# Patient Record
Sex: MALE | Employment: FULL TIME | URBAN - METROPOLITAN AREA
[De-identification: names, ages, dates, MRNs, and addresses within clinical notes are randomized per-mention and may not be internally consistent; named-entity substitution may affect disease eponyms.]

---

## 2017-01-01 ENCOUNTER — APPOINTMENT (OUTPATIENT)
Dept: CT IMAGING | Age: 47
DRG: 246 | End: 2017-01-01
Attending: INTERNAL MEDICINE
Payer: COMMERCIAL

## 2017-01-01 ENCOUNTER — APPOINTMENT (OUTPATIENT)
Dept: GENERAL RADIOLOGY | Age: 47
DRG: 246 | End: 2017-01-01
Attending: INTERNAL MEDICINE
Payer: COMMERCIAL

## 2017-01-01 ENCOUNTER — APPOINTMENT (OUTPATIENT)
Dept: CT IMAGING | Age: 47
DRG: 246 | End: 2017-01-01
Attending: PSYCHIATRY & NEUROLOGY
Payer: COMMERCIAL

## 2017-01-01 ENCOUNTER — HOSPITAL ENCOUNTER (INPATIENT)
Age: 47
LOS: 6 days | DRG: 246 | End: 2017-05-15
Attending: EMERGENCY MEDICINE | Admitting: INTERNAL MEDICINE
Payer: COMMERCIAL

## 2017-01-01 VITALS
BODY MASS INDEX: 39.42 KG/M2 | RESPIRATION RATE: 25 BRPM | HEIGHT: 70 IN | SYSTOLIC BLOOD PRESSURE: 124 MMHG | OXYGEN SATURATION: 14 % | DIASTOLIC BLOOD PRESSURE: 68 MMHG | HEART RATE: 45 BPM | WEIGHT: 275.35 LBS | TEMPERATURE: 100.3 F

## 2017-01-01 DIAGNOSIS — I46.9 CARDIAC ARREST (HCC): ICD-10-CM

## 2017-01-01 DIAGNOSIS — I21.02 ST ELEVATION MYOCARDIAL INFARCTION INVOLVING LEFT ANTERIOR DESCENDING (LAD) CORONARY ARTERY (HCC): ICD-10-CM

## 2017-01-01 DIAGNOSIS — E78.01 FAMILIAL HYPERCHOLESTEROLEMIA: ICD-10-CM

## 2017-01-01 DIAGNOSIS — R40.2431 GLASGOW COMA SCALE TOTAL SCORE 3-8, IN THE FIELD (EMT OR AMBULANCE) (HCC): ICD-10-CM

## 2017-01-01 DIAGNOSIS — M47.812 SPONDYLOSIS OF CERVICAL REGION WITHOUT MYELOPATHY OR RADICULOPATHY: ICD-10-CM

## 2017-01-01 DIAGNOSIS — G93.1 ANOXIC BRAIN INJURY (HCC): ICD-10-CM

## 2017-01-01 DIAGNOSIS — I10 ESSENTIAL HYPERTENSION: ICD-10-CM

## 2017-01-01 DIAGNOSIS — I21.3 ST ELEVATION MYOCARDIAL INFARCTION (STEMI), UNSPECIFIED ARTERY (HCC): Primary | ICD-10-CM

## 2017-01-01 DIAGNOSIS — R56.9 SEIZURE (HCC): ICD-10-CM

## 2017-01-01 LAB
ABO + RH BLD: NORMAL
ACT BLD: 389 SECS (ref 79–138)
ALBUMIN SERPL BCP-MCNC: 2.4 G/DL (ref 3.5–5)
ALBUMIN SERPL BCP-MCNC: 2.7 G/DL (ref 3.5–5)
ALBUMIN SERPL BCP-MCNC: 3.1 G/DL (ref 3.5–5)
ALBUMIN SERPL BCP-MCNC: 3.4 G/DL (ref 3.5–5)
ALBUMIN SERPL BCP-MCNC: 3.4 G/DL (ref 3.5–5)
ALBUMIN/GLOB SERPL: 0.6 {RATIO} (ref 1.1–2.2)
ALBUMIN/GLOB SERPL: 0.7 {RATIO} (ref 1.1–2.2)
ALBUMIN/GLOB SERPL: 0.9 {RATIO} (ref 1.1–2.2)
ALP SERPL-CCNC: 105 U/L (ref 45–117)
ALP SERPL-CCNC: 70 U/L (ref 45–117)
ALP SERPL-CCNC: 73 U/L (ref 45–117)
ALP SERPL-CCNC: 78 U/L (ref 45–117)
ALP SERPL-CCNC: 87 U/L (ref 45–117)
ALT SERPL-CCNC: 120 U/L (ref 12–78)
ALT SERPL-CCNC: 123 U/L (ref 12–78)
ALT SERPL-CCNC: 54 U/L (ref 12–78)
ALT SERPL-CCNC: 59 U/L (ref 12–78)
ALT SERPL-CCNC: 77 U/L (ref 12–78)
ANION GAP BLD CALC-SCNC: 10 MMOL/L (ref 5–15)
ANION GAP BLD CALC-SCNC: 11 MMOL/L (ref 5–15)
ANION GAP BLD CALC-SCNC: 11 MMOL/L (ref 5–15)
ANION GAP BLD CALC-SCNC: 13 MMOL/L (ref 5–15)
ANION GAP BLD CALC-SCNC: 7 MMOL/L (ref 5–15)
ANION GAP BLD CALC-SCNC: 7 MMOL/L (ref 5–15)
ANION GAP BLD CALC-SCNC: 9 MMOL/L (ref 5–15)
ARTERIAL PATENCY WRIST A: ABNORMAL
ARTERIAL PATENCY WRIST A: ABNORMAL
ARTERIAL PATENCY WRIST A: YES
AST SERPL W P-5'-P-CCNC: 138 U/L (ref 15–37)
AST SERPL W P-5'-P-CCNC: 193 U/L (ref 15–37)
AST SERPL W P-5'-P-CCNC: 72 U/L (ref 15–37)
AST SERPL W P-5'-P-CCNC: 77 U/L (ref 15–37)
AST SERPL W P-5'-P-CCNC: 97 U/L (ref 15–37)
ATRIAL RATE: 75 BPM
BASE DEFICIT BLDA-SCNC: 1 MMOL/L
BASE DEFICIT BLDA-SCNC: 1.2 MMOL/L
BASE DEFICIT BLDA-SCNC: 1.4 MMOL/L
BASE DEFICIT BLDA-SCNC: 1.7 MMOL/L
BASE DEFICIT BLDA-SCNC: 3.3 MMOL/L
BASE EXCESS BLDA CALC-SCNC: 0 MMOL/L
BASE EXCESS BLDA CALC-SCNC: 1.8 MMOL/L
BASE EXCESS BLDA CALC-SCNC: 2.5 MMOL/L
BASOPHILS # BLD AUTO: 0 K/UL
BASOPHILS # BLD: 0 %
BDY SITE: ABNORMAL
BILIRUB SERPL-MCNC: 0.4 MG/DL (ref 0.2–1)
BILIRUB SERPL-MCNC: 0.5 MG/DL (ref 0.2–1)
BILIRUB SERPL-MCNC: 0.6 MG/DL (ref 0.2–1)
BLOOD GROUP ANTIBODIES SERPL: NORMAL
BREATHS.SPONTANEOUS ON VENT: 20
BREATHS.SPONTANEOUS ON VENT: 20
BUN SERPL-MCNC: 14 MG/DL (ref 6–20)
BUN SERPL-MCNC: 15 MG/DL (ref 6–20)
BUN SERPL-MCNC: 17 MG/DL (ref 6–20)
BUN SERPL-MCNC: 17 MG/DL (ref 6–20)
BUN SERPL-MCNC: 18 MG/DL (ref 6–20)
BUN SERPL-MCNC: 19 MG/DL (ref 6–20)
BUN SERPL-MCNC: 21 MG/DL (ref 6–20)
BUN/CREAT SERPL: 12 (ref 12–20)
BUN/CREAT SERPL: 14 (ref 12–20)
BUN/CREAT SERPL: 15 (ref 12–20)
BUN/CREAT SERPL: 15 (ref 12–20)
BUN/CREAT SERPL: 16 (ref 12–20)
BUN/CREAT SERPL: 20 (ref 12–20)
BUN/CREAT SERPL: 24 (ref 12–20)
CALCIUM SERPL-MCNC: 7.9 MG/DL (ref 8.5–10.1)
CALCIUM SERPL-MCNC: 8.1 MG/DL (ref 8.5–10.1)
CALCIUM SERPL-MCNC: 8.1 MG/DL (ref 8.5–10.1)
CALCIUM SERPL-MCNC: 8.2 MG/DL (ref 8.5–10.1)
CALCIUM SERPL-MCNC: 8.4 MG/DL (ref 8.5–10.1)
CALCIUM SERPL-MCNC: 8.4 MG/DL (ref 8.5–10.1)
CALCIUM SERPL-MCNC: 8.5 MG/DL (ref 8.5–10.1)
CALCULATED P AXIS, ECG09: 33 DEGREES
CALCULATED R AXIS, ECG10: -4 DEGREES
CALCULATED T AXIS, ECG11: 84 DEGREES
CHLORIDE SERPL-SCNC: 100 MMOL/L (ref 97–108)
CHLORIDE SERPL-SCNC: 102 MMOL/L (ref 97–108)
CHLORIDE SERPL-SCNC: 102 MMOL/L (ref 97–108)
CHLORIDE SERPL-SCNC: 104 MMOL/L (ref 97–108)
CHLORIDE SERPL-SCNC: 105 MMOL/L (ref 97–108)
CHOLEST SERPL-MCNC: 168 MG/DL
CK MB CFR SERPL CALC: 3.1 % (ref 0–2.5)
CK MB CFR SERPL CALC: 4.9 % (ref 0–2.5)
CK MB SERPL-MCNC: 46.4 NG/ML (ref 5–25)
CK MB SERPL-MCNC: 90.2 NG/ML (ref 5–25)
CK SERPL-CCNC: 2872 U/L (ref 39–308)
CK SERPL-CCNC: 951 U/L (ref 39–308)
CO2 SERPL-SCNC: 19 MMOL/L (ref 21–32)
CO2 SERPL-SCNC: 23 MMOL/L (ref 21–32)
CO2 SERPL-SCNC: 24 MMOL/L (ref 21–32)
CO2 SERPL-SCNC: 24 MMOL/L (ref 21–32)
CO2 SERPL-SCNC: 25 MMOL/L (ref 21–32)
CO2 SERPL-SCNC: 25 MMOL/L (ref 21–32)
CO2 SERPL-SCNC: 27 MMOL/L (ref 21–32)
CREAT SERPL-MCNC: 0.74 MG/DL (ref 0.7–1.3)
CREAT SERPL-MCNC: 0.93 MG/DL (ref 0.7–1.3)
CREAT SERPL-MCNC: 0.97 MG/DL (ref 0.7–1.3)
CREAT SERPL-MCNC: 1.07 MG/DL (ref 0.7–1.3)
CREAT SERPL-MCNC: 1.15 MG/DL (ref 0.7–1.3)
CREAT SERPL-MCNC: 1.32 MG/DL (ref 0.7–1.3)
CREAT SERPL-MCNC: 1.47 MG/DL (ref 0.7–1.3)
DIAGNOSIS, 93000: NORMAL
DIFFERENTIAL METHOD BLD: ABNORMAL
EOSINOPHIL # BLD: 0 K/UL
EOSINOPHIL # BLD: 0.2 K/UL
EOSINOPHIL NFR BLD: 0 %
EOSINOPHIL NFR BLD: 1 %
EPAP/CPAP/PEEP, PAPEEP: 10
EPAP/CPAP/PEEP, PAPEEP: 13
EPAP/CPAP/PEEP, PAPEEP: 5
ERYTHROCYTE [DISTWIDTH] IN BLOOD BY AUTOMATED COUNT: 12.9 % (ref 11.5–14.5)
ERYTHROCYTE [DISTWIDTH] IN BLOOD BY AUTOMATED COUNT: 13.3 % (ref 11.5–14.5)
ERYTHROCYTE [DISTWIDTH] IN BLOOD BY AUTOMATED COUNT: 13.5 % (ref 11.5–14.5)
EST. AVERAGE GLUCOSE BLD GHB EST-MCNC: 111 MG/DL
FIO2 ON VENT: 100 %
FIO2 ON VENT: 90 %
GAS FLOW.O2 SETTING OXYMISER: 12 L/MIN
GAS FLOW.O2 SETTING OXYMISER: 20 L/MIN
GLOBULIN SER CALC-MCNC: 3.5 G/DL (ref 2–4)
GLOBULIN SER CALC-MCNC: 3.7 G/DL (ref 2–4)
GLOBULIN SER CALC-MCNC: 3.7 G/DL (ref 2–4)
GLOBULIN SER CALC-MCNC: 4 G/DL (ref 2–4)
GLOBULIN SER CALC-MCNC: 4 G/DL (ref 2–4)
GLUCOSE SERPL-MCNC: 102 MG/DL (ref 65–100)
GLUCOSE SERPL-MCNC: 106 MG/DL (ref 65–100)
GLUCOSE SERPL-MCNC: 109 MG/DL (ref 65–100)
GLUCOSE SERPL-MCNC: 127 MG/DL (ref 65–100)
GLUCOSE SERPL-MCNC: 129 MG/DL (ref 65–100)
GLUCOSE SERPL-MCNC: 131 MG/DL (ref 65–100)
GLUCOSE SERPL-MCNC: 98 MG/DL (ref 65–100)
HBA1C MFR BLD: 5.5 % (ref 4.2–6.3)
HCO3 BLDA-SCNC: 20 MMOL/L (ref 22–26)
HCO3 BLDA-SCNC: 21 MMOL/L (ref 22–26)
HCO3 BLDA-SCNC: 22 MMOL/L (ref 22–26)
HCO3 BLDA-SCNC: 24 MMOL/L (ref 22–26)
HCO3 BLDA-SCNC: 25 MMOL/L (ref 22–26)
HCO3 BLDA-SCNC: 27 MMOL/L (ref 22–26)
HCT VFR BLD AUTO: 32.5 % (ref 36.6–50.3)
HCT VFR BLD AUTO: 35.4 % (ref 36.6–50.3)
HCT VFR BLD AUTO: 37.1 % (ref 36.6–50.3)
HCT VFR BLD AUTO: 39.5 % (ref 36.6–50.3)
HCT VFR BLD AUTO: 44.9 % (ref 36.6–50.3)
HCT VFR BLD AUTO: 45 % (ref 36.6–50.3)
HDLC SERPL-MCNC: 39 MG/DL
HDLC SERPL: 4.3 {RATIO} (ref 0–5)
HGB BLD-MCNC: 10.7 G/DL (ref 12.1–17)
HGB BLD-MCNC: 12 G/DL (ref 12.1–17)
HGB BLD-MCNC: 12.4 G/DL (ref 12.1–17)
HGB BLD-MCNC: 13.5 G/DL (ref 12.1–17)
HGB BLD-MCNC: 15.3 G/DL (ref 12.1–17)
HGB BLD-MCNC: 15.7 G/DL (ref 12.1–17)
INR PPP: 1 (ref 0.9–1.1)
INR PPP: 2.5 (ref 0.9–1.1)
IPAP/PIP, IPAPIP: 25
LDLC SERPL CALC-MCNC: 101 MG/DL (ref 0–100)
LIPID PROFILE,FLP: ABNORMAL
LYMPHOCYTES # BLD AUTO: 10 %
LYMPHOCYTES # BLD AUTO: 12 %
LYMPHOCYTES # BLD AUTO: 4 %
LYMPHOCYTES # BLD AUTO: 6 %
LYMPHOCYTES # BLD AUTO: 6 %
LYMPHOCYTES # BLD: 1.2 K/UL
LYMPHOCYTES # BLD: 1.3 K/UL
LYMPHOCYTES # BLD: 1.8 K/UL
LYMPHOCYTES # BLD: 2.5 K/UL
LYMPHOCYTES # BLD: 2.7 K/UL
MAGNESIUM SERPL-MCNC: 2.1 MG/DL (ref 1.6–2.4)
MAGNESIUM SERPL-MCNC: 2.2 MG/DL (ref 1.6–2.4)
MAGNESIUM SERPL-MCNC: 2.4 MG/DL (ref 1.6–2.4)
MCH RBC QN AUTO: 29.9 PG (ref 26–34)
MCH RBC QN AUTO: 30.2 PG (ref 26–34)
MCH RBC QN AUTO: 30.5 PG (ref 26–34)
MCH RBC QN AUTO: 30.8 PG (ref 26–34)
MCH RBC QN AUTO: 31.1 PG (ref 26–34)
MCH RBC QN AUTO: 31.4 PG (ref 26–34)
MCHC RBC AUTO-ENTMCNC: 32.9 G/DL (ref 30–36.5)
MCHC RBC AUTO-ENTMCNC: 33.4 G/DL (ref 30–36.5)
MCHC RBC AUTO-ENTMCNC: 33.9 G/DL (ref 30–36.5)
MCHC RBC AUTO-ENTMCNC: 34 G/DL (ref 30–36.5)
MCHC RBC AUTO-ENTMCNC: 34.2 G/DL (ref 30–36.5)
MCHC RBC AUTO-ENTMCNC: 35 G/DL (ref 30–36.5)
MCV RBC AUTO: 89.8 FL (ref 80–99)
MCV RBC AUTO: 90 FL (ref 80–99)
MCV RBC AUTO: 90.1 FL (ref 80–99)
MCV RBC AUTO: 90.5 FL (ref 80–99)
MCV RBC AUTO: 90.8 FL (ref 80–99)
MCV RBC AUTO: 91.5 FL (ref 80–99)
METAMYELOCYTES NFR BLD MANUAL: 2 %
MONOCYTES # BLD: 1.2 K/UL
MONOCYTES # BLD: 1.2 K/UL
MONOCYTES # BLD: 1.5 K/UL
MONOCYTES # BLD: 1.6 K/UL
MONOCYTES # BLD: 1.7 K/UL
MONOCYTES NFR BLD AUTO: 4 %
MONOCYTES NFR BLD AUTO: 4 %
MONOCYTES NFR BLD AUTO: 6 %
MONOCYTES NFR BLD AUTO: 7 %
MONOCYTES NFR BLD AUTO: 8 %
NEUTS BAND NFR BLD MANUAL: 1 %
NEUTS BAND NFR BLD MANUAL: 5 %
NEUTS BAND NFR BLD MANUAL: 5 %
NEUTS SEG # BLD: 16.4 K/UL
NEUTS SEG # BLD: 18.5 K/UL
NEUTS SEG # BLD: 22.3 K/UL
NEUTS SEG # BLD: 26.8 K/UL
NEUTS SEG # BLD: 27.1 K/UL
NEUTS SEG NFR BLD AUTO: 79 %
NEUTS SEG NFR BLD AUTO: 83 %
NEUTS SEG NFR BLD AUTO: 84 %
NEUTS SEG NFR BLD AUTO: 86 %
NEUTS SEG NFR BLD AUTO: 87 %
NRBC # BLD: 0 K/UL (ref 0–0.01)
NRBC # BLD: 0.22 K/UL (ref 0–0.01)
NRBC BLD-RTO: 0 PER 100 WBC
NRBC BLD-RTO: 0.7 PER 100 WBC
P-R INTERVAL, ECG05: 170 MS
PCO2 BLDA: 26 MMHG (ref 35–45)
PCO2 BLDA: 31 MMHG (ref 35–45)
PCO2 BLDA: 33 MMHG (ref 35–45)
PCO2 BLDA: 33 MMHG (ref 35–45)
PCO2 BLDA: 35 MMHG (ref 35–45)
PCO2 BLDA: 40 MMHG (ref 35–45)
PCO2 BLDA: 43 MMHG (ref 35–45)
PCO2 BLDA: 49 MMHG (ref 35–45)
PH BLDA: 7.32 [PH] (ref 7.35–7.45)
PH BLDA: 7.36 [PH] (ref 7.35–7.45)
PH BLDA: 7.41 [PH] (ref 7.35–7.45)
PH BLDA: 7.44 [PH] (ref 7.35–7.45)
PH BLDA: 7.44 [PH] (ref 7.35–7.45)
PH BLDA: 7.45 [PH] (ref 7.35–7.45)
PH BLDA: 7.51 [PH] (ref 7.35–7.45)
PH BLDA: 7.51 [PH] (ref 7.35–7.45)
PLATELET # BLD AUTO: 193 K/UL (ref 150–400)
PLATELET # BLD AUTO: 213 K/UL (ref 150–400)
PLATELET # BLD AUTO: 225 K/UL (ref 150–400)
PLATELET # BLD AUTO: 239 K/UL (ref 150–400)
PLATELET # BLD AUTO: 284 K/UL (ref 150–400)
PLATELET # BLD AUTO: 286 K/UL (ref 150–400)
PLATELET COMMENTS,PCOM: ABNORMAL
PO2 BLDA: 113 MMHG (ref 80–100)
PO2 BLDA: 199 MMHG (ref 80–100)
PO2 BLDA: 52 MMHG (ref 80–100)
PO2 BLDA: 61 MMHG (ref 80–100)
PO2 BLDA: 62 MMHG (ref 80–100)
PO2 BLDA: 87 MMHG (ref 80–100)
PO2 BLDA: 89 MMHG (ref 80–100)
PO2 BLDA: 99 MMHG (ref 80–100)
POTASSIUM SERPL-SCNC: 3.1 MMOL/L (ref 3.5–5.1)
POTASSIUM SERPL-SCNC: 3.2 MMOL/L (ref 3.5–5.1)
POTASSIUM SERPL-SCNC: 3.3 MMOL/L (ref 3.5–5.1)
POTASSIUM SERPL-SCNC: 3.3 MMOL/L (ref 3.5–5.1)
POTASSIUM SERPL-SCNC: 3.4 MMOL/L (ref 3.5–5.1)
POTASSIUM SERPL-SCNC: 3.6 MMOL/L (ref 3.5–5.1)
POTASSIUM SERPL-SCNC: 3.7 MMOL/L (ref 3.5–5.1)
PROT SERPL-MCNC: 6.4 G/DL (ref 6.4–8.2)
PROT SERPL-MCNC: 6.6 G/DL (ref 6.4–8.2)
PROT SERPL-MCNC: 6.7 G/DL (ref 6.4–8.2)
PROT SERPL-MCNC: 7.1 G/DL (ref 6.4–8.2)
PROT SERPL-MCNC: 7.1 G/DL (ref 6.4–8.2)
PROTHROMBIN TIME: 10.1 SEC (ref 9–11.1)
PROTHROMBIN TIME: 26.2 SEC (ref 9–11.1)
Q-T INTERVAL, ECG07: 378 MS
QRS DURATION, ECG06: 104 MS
QTC CALCULATION (BEZET), ECG08: 422 MS
RBC # BLD AUTO: 3.58 M/UL (ref 4.1–5.7)
RBC # BLD AUTO: 3.93 M/UL (ref 4.1–5.7)
RBC # BLD AUTO: 4.1 M/UL (ref 4.1–5.7)
RBC # BLD AUTO: 4.39 M/UL (ref 4.1–5.7)
RBC # BLD AUTO: 4.92 M/UL (ref 4.1–5.7)
RBC # BLD AUTO: 5 M/UL (ref 4.1–5.7)
RBC MORPH BLD: ABNORMAL
SAO2 % BLD: 89 % (ref 92–97)
SAO2 % BLD: 91 % (ref 92–97)
SAO2 % BLD: 94 % (ref 92–97)
SAO2 % BLD: 97 % (ref 92–97)
SAO2 % BLD: 97 % (ref 92–97)
SAO2 % BLD: 98 % (ref 92–97)
SAO2 % BLD: 98 % (ref 92–97)
SAO2 % BLD: 99 % (ref 92–97)
SAO2% DEVICE SAO2% SENSOR NAME: ABNORMAL
SODIUM SERPL-SCNC: 136 MMOL/L (ref 136–145)
SODIUM SERPL-SCNC: 137 MMOL/L (ref 136–145)
SODIUM SERPL-SCNC: 137 MMOL/L (ref 136–145)
SODIUM SERPL-SCNC: 140 MMOL/L (ref 136–145)
SPECIMEN EXP DATE BLD: NORMAL
SPECIMEN SITE: ABNORMAL
TRIGL SERPL-MCNC: 140 MG/DL (ref ?–150)
TROPONIN I SERPL-MCNC: 11.5 NG/ML
TROPONIN I SERPL-MCNC: 41.8 NG/ML
VENTILATION MODE VENT: ABNORMAL
VENTRICULAR RATE, ECG03: 75 BPM
VLDLC SERPL CALC-MCNC: 28 MG/DL
VT SETTING VENT: 550 ML
VT SETTING VENT: 600 ML
WBC # BLD AUTO: 20.8 K/UL (ref 4.1–11.1)
WBC # BLD AUTO: 21.3 K/UL (ref 4.1–11.1)
WBC # BLD AUTO: 23.3 K/UL (ref 4.1–11.1)
WBC # BLD AUTO: 26.6 K/UL (ref 4.1–11.1)
WBC # BLD AUTO: 29.5 K/UL (ref 4.1–11.1)
WBC # BLD AUTO: 30.4 K/UL (ref 4.1–11.1)
WBC NRBC COR # BLD: ABNORMAL 10*3/UL

## 2017-01-01 PROCEDURE — 36415 COLL VENOUS BLD VENIPUNCTURE: CPT | Performed by: INTERNAL MEDICINE

## 2017-01-01 PROCEDURE — 85025 COMPLETE CBC W/AUTO DIFF WBC: CPT | Performed by: INTERNAL MEDICINE

## 2017-01-01 PROCEDURE — 74011000250 HC RX REV CODE- 250: Performed by: INTERNAL MEDICINE

## 2017-01-01 PROCEDURE — 94640 AIRWAY INHALATION TREATMENT: CPT

## 2017-01-01 PROCEDURE — 74011250636 HC RX REV CODE- 250/636: Performed by: SPECIALIST

## 2017-01-01 PROCEDURE — 85610 PROTHROMBIN TIME: CPT | Performed by: INTERNAL MEDICINE

## 2017-01-01 PROCEDURE — 74011250637 HC RX REV CODE- 250/637: Performed by: INTERNAL MEDICINE

## 2017-01-01 PROCEDURE — 83735 ASSAY OF MAGNESIUM: CPT | Performed by: INTERNAL MEDICINE

## 2017-01-01 PROCEDURE — 74011250636 HC RX REV CODE- 250/636: Performed by: INTERNAL MEDICINE

## 2017-01-01 PROCEDURE — B2111ZZ FLUOROSCOPY OF MULTIPLE CORONARY ARTERIES USING LOW OSMOLAR CONTRAST: ICD-10-PCS | Performed by: INTERNAL MEDICINE

## 2017-01-01 PROCEDURE — 80061 LIPID PANEL: CPT | Performed by: INTERNAL MEDICINE

## 2017-01-01 PROCEDURE — 4A023N7 MEASUREMENT OF CARDIAC SAMPLING AND PRESSURE, LEFT HEART, PERCUTANEOUS APPROACH: ICD-10-PCS | Performed by: INTERNAL MEDICINE

## 2017-01-01 PROCEDURE — 80048 BASIC METABOLIC PNL TOTAL CA: CPT | Performed by: INTERNAL MEDICINE

## 2017-01-01 PROCEDURE — 74011000250 HC RX REV CODE- 250: Performed by: SPECIALIST

## 2017-01-01 PROCEDURE — 65620000000 HC RM CCU GENERAL

## 2017-01-01 PROCEDURE — 82803 BLOOD GASES ANY COMBINATION: CPT | Performed by: INTERNAL MEDICINE

## 2017-01-01 PROCEDURE — C1894 INTRO/SHEATH, NON-LASER: HCPCS

## 2017-01-01 PROCEDURE — 74011000258 HC RX REV CODE- 258: Performed by: INTERNAL MEDICINE

## 2017-01-01 PROCEDURE — 5A1955Z RESPIRATORY VENTILATION, GREATER THAN 96 CONSECUTIVE HOURS: ICD-10-PCS | Performed by: INTERNAL MEDICINE

## 2017-01-01 PROCEDURE — 77030010221 HC SPLNT WR POS TELE -B

## 2017-01-01 PROCEDURE — 80053 COMPREHEN METABOLIC PANEL: CPT | Performed by: INTERNAL MEDICINE

## 2017-01-01 PROCEDURE — 36600 WITHDRAWAL OF ARTERIAL BLOOD: CPT | Performed by: INTERNAL MEDICINE

## 2017-01-01 PROCEDURE — 77030018846 HC SOL IRR STRL H20 ICUM -A

## 2017-01-01 PROCEDURE — C9113 INJ PANTOPRAZOLE SODIUM, VIA: HCPCS | Performed by: SPECIALIST

## 2017-01-01 PROCEDURE — 71010 XR CHEST PORT: CPT

## 2017-01-01 PROCEDURE — 74000 XR ABD (KUB): CPT

## 2017-01-01 PROCEDURE — 0DH67UZ INSERTION OF FEEDING DEVICE INTO STOMACH, VIA NATURAL OR ARTIFICIAL OPENING: ICD-10-PCS | Performed by: INTERNAL MEDICINE

## 2017-01-01 PROCEDURE — 77030004543 HC CATH ANGI DX MRTM -A

## 2017-01-01 PROCEDURE — 77030034848

## 2017-01-01 PROCEDURE — 82550 ASSAY OF CK (CPK): CPT | Performed by: INTERNAL MEDICINE

## 2017-01-01 PROCEDURE — C1887 CATHETER, GUIDING: HCPCS

## 2017-01-01 PROCEDURE — 77030008543 HC TBNG MON PRSS MRTM -A

## 2017-01-01 PROCEDURE — 94003 VENT MGMT INPAT SUBQ DAY: CPT

## 2017-01-01 PROCEDURE — 70450 CT HEAD/BRAIN W/O DYE: CPT

## 2017-01-01 PROCEDURE — 51798 US URINE CAPACITY MEASURE: CPT

## 2017-01-01 PROCEDURE — 93458 L HRT ARTERY/VENTRICLE ANGIO: CPT

## 2017-01-01 PROCEDURE — 84484 ASSAY OF TROPONIN QUANT: CPT | Performed by: INTERNAL MEDICINE

## 2017-01-01 PROCEDURE — 74011250636 HC RX REV CODE- 250/636: Performed by: PSYCHIATRY & NEUROLOGY

## 2017-01-01 PROCEDURE — C1769 GUIDE WIRE: HCPCS

## 2017-01-01 PROCEDURE — 77030013140 HC MSK NEB VYRM -A

## 2017-01-01 PROCEDURE — 77030004549 HC CATH ANGI DX PRF MRTM -A

## 2017-01-01 PROCEDURE — 75810000275 HC EMERGENCY DEPT VISIT NO LEVEL OF CARE

## 2017-01-01 PROCEDURE — 94761 N-INVAS EAR/PLS OXIMETRY MLT: CPT

## 2017-01-01 PROCEDURE — C1874 STENT, COATED/COV W/DEL SYS: HCPCS

## 2017-01-01 PROCEDURE — 74011000258 HC RX REV CODE- 258: Performed by: PSYCHIATRY & NEUROLOGY

## 2017-01-01 PROCEDURE — 82553 CREATINE MB FRACTION: CPT | Performed by: INTERNAL MEDICINE

## 2017-01-01 PROCEDURE — 95816 EEG AWAKE AND DROWSY: CPT | Performed by: PSYCHIATRY & NEUROLOGY

## 2017-01-01 PROCEDURE — 77030016707 HC CATH ANGI DX SUPT1 CARD -B

## 2017-01-01 PROCEDURE — 74011000250 HC RX REV CODE- 250

## 2017-01-01 PROCEDURE — 74011636320 HC RX REV CODE- 636/320: Performed by: INTERNAL MEDICINE

## 2017-01-01 PROCEDURE — 94002 VENT MGMT INPAT INIT DAY: CPT

## 2017-01-01 PROCEDURE — C1725 CATH, TRANSLUMIN NON-LASER: HCPCS

## 2017-01-01 PROCEDURE — 85027 COMPLETE CBC AUTOMATED: CPT | Performed by: INTERNAL MEDICINE

## 2017-01-01 PROCEDURE — 93041 RHYTHM ECG TRACING: CPT

## 2017-01-01 PROCEDURE — 77030018798 HC PMP KT ENTRL FED COVD -A

## 2017-01-01 PROCEDURE — 77030018729 HC ELECTRD DEFIB PAD CARD -B

## 2017-01-01 PROCEDURE — 85347 COAGULATION TIME ACTIVATED: CPT

## 2017-01-01 PROCEDURE — 3E03317 INTRODUCTION OF OTHER THROMBOLYTIC INTO PERIPHERAL VEIN, PERCUTANEOUS APPROACH: ICD-10-PCS | Performed by: INTERNAL MEDICINE

## 2017-01-01 PROCEDURE — 72125 CT NECK SPINE W/O DYE: CPT

## 2017-01-01 PROCEDURE — 95951 EEG 24 HR W/ VIDEO: CPT | Performed by: PSYCHIATRY & NEUROLOGY

## 2017-01-01 PROCEDURE — 74011250637 HC RX REV CODE- 250/637: Performed by: PSYCHIATRY & NEUROLOGY

## 2017-01-01 PROCEDURE — 77030008591 HC TRAP FNGR HK CNMD -B

## 2017-01-01 PROCEDURE — 027035Z DILATION OF CORONARY ARTERY, ONE ARTERY WITH TWO DRUG-ELUTING INTRALUMINAL DEVICES, PERCUTANEOUS APPROACH: ICD-10-PCS | Performed by: INTERNAL MEDICINE

## 2017-01-01 PROCEDURE — 93306 TTE W/DOPPLER COMPLETE: CPT

## 2017-01-01 PROCEDURE — 83036 HEMOGLOBIN GLYCOSYLATED A1C: CPT | Performed by: INTERNAL MEDICINE

## 2017-01-01 PROCEDURE — B2151ZZ FLUOROSCOPY OF LEFT HEART USING LOW OSMOLAR CONTRAST: ICD-10-PCS | Performed by: INTERNAL MEDICINE

## 2017-01-01 PROCEDURE — 74011250636 HC RX REV CODE- 250/636

## 2017-01-01 PROCEDURE — 77030019698 HC SYR ANGI MDLON MRTM -A

## 2017-01-01 PROCEDURE — 74011636320 HC RX REV CODE- 636/320

## 2017-01-01 PROCEDURE — 86900 BLOOD TYPING SEROLOGIC ABO: CPT | Performed by: INTERNAL MEDICINE

## 2017-01-01 PROCEDURE — 77030019569 HC BND COMPR RAD TERU -B

## 2017-01-01 RX ORDER — SODIUM CHLORIDE 0.9 % (FLUSH) 0.9 %
5-10 SYRINGE (ML) INJECTION EVERY 8 HOURS
Status: DISCONTINUED | OUTPATIENT
Start: 2017-01-01 | End: 2017-01-01 | Stop reason: SDUPTHER

## 2017-01-01 RX ORDER — LIDOCAINE HYDROCHLORIDE 10 MG/ML
1-30 INJECTION, SOLUTION EPIDURAL; INFILTRATION; INTRACAUDAL; PERINEURAL
Status: DISCONTINUED | OUTPATIENT
Start: 2017-01-01 | End: 2017-01-01

## 2017-01-01 RX ORDER — SODIUM CHLORIDE 9 MG/ML
INJECTION, SOLUTION INTRAVENOUS
Status: DISCONTINUED
Start: 2017-01-01 | End: 2017-01-01 | Stop reason: HOSPADM

## 2017-01-01 RX ORDER — ACETAMINOPHEN 325 MG/1
650 TABLET ORAL
Status: DISCONTINUED | OUTPATIENT
Start: 2017-01-01 | End: 2017-01-01

## 2017-01-01 RX ORDER — HYDROCHLOROTHIAZIDE 25 MG/1
25 TABLET ORAL DAILY
COMMUNITY

## 2017-01-01 RX ORDER — METOPROLOL TARTRATE 25 MG/1
25 TABLET, FILM COATED ORAL EVERY 12 HOURS
Status: DISCONTINUED | OUTPATIENT
Start: 2017-01-01 | End: 2017-01-01

## 2017-01-01 RX ORDER — LORAZEPAM 2 MG/ML
INJECTION, SOLUTION INTRAMUSCULAR; INTRAVENOUS
Status: DISCONTINUED
Start: 2017-01-01 | End: 2017-01-01 | Stop reason: HOSPADM

## 2017-01-01 RX ORDER — HEPARIN SODIUM 1000 [USP'U]/ML
1000-10000 INJECTION, SOLUTION INTRAVENOUS; SUBCUTANEOUS
Status: DISCONTINUED | OUTPATIENT
Start: 2017-01-01 | End: 2017-01-01

## 2017-01-01 RX ORDER — ENOXAPARIN SODIUM 100 MG/ML
40 INJECTION SUBCUTANEOUS EVERY 24 HOURS
Status: DISCONTINUED | OUTPATIENT
Start: 2017-01-01 | End: 2017-01-01 | Stop reason: HOSPADM

## 2017-01-01 RX ORDER — PROPOFOL 10 MG/ML
INJECTION, EMULSION INTRAVENOUS
Status: DISCONTINUED
Start: 2017-01-01 | End: 2017-01-01 | Stop reason: HOSPADM

## 2017-01-01 RX ORDER — EPTIFIBATIDE 0.75 MG/ML
2 INJECTION, SOLUTION INTRAVENOUS CONTINUOUS
Status: DISPENSED | OUTPATIENT
Start: 2017-01-01 | End: 2017-01-01

## 2017-01-01 RX ORDER — SODIUM CHLORIDE 900 MG/100ML
INJECTION INTRAVENOUS
Status: DISCONTINUED
Start: 2017-01-01 | End: 2017-01-01 | Stop reason: HOSPADM

## 2017-01-01 RX ORDER — HEPARIN SODIUM 200 [USP'U]/100ML
INJECTION, SOLUTION INTRAVENOUS
Status: COMPLETED
Start: 2017-01-01 | End: 2017-01-01

## 2017-01-01 RX ORDER — PHENYLEPHRINE HYDROCHLORIDE 10 MG/ML
INJECTION INTRAVENOUS
Status: COMPLETED
Start: 2017-01-01 | End: 2017-01-01

## 2017-01-01 RX ORDER — ASPIRIN 300 MG/1
600 SUPPOSITORY RECTAL ONCE
Status: ACTIVE | OUTPATIENT
Start: 2017-01-01 | End: 2017-01-01

## 2017-01-01 RX ORDER — METOPROLOL TARTRATE 25 MG/1
12.5 TABLET, FILM COATED ORAL EVERY 12 HOURS
Status: DISCONTINUED | OUTPATIENT
Start: 2017-01-01 | End: 2017-01-01 | Stop reason: HOSPADM

## 2017-01-01 RX ORDER — FUROSEMIDE 10 MG/ML
40 INJECTION INTRAMUSCULAR; INTRAVENOUS ONCE
Status: COMPLETED | OUTPATIENT
Start: 2017-01-01 | End: 2017-01-01

## 2017-01-01 RX ORDER — LIDOCAINE HYDROCHLORIDE 10 MG/ML
INJECTION, SOLUTION EPIDURAL; INFILTRATION; INTRACAUDAL; PERINEURAL
Status: DISCONTINUED
Start: 2017-01-01 | End: 2017-01-01 | Stop reason: HOSPADM

## 2017-01-01 RX ORDER — POTASSIUM CHLORIDE 29.8 MG/ML
20 INJECTION INTRAVENOUS
Status: DISCONTINUED | OUTPATIENT
Start: 2017-01-01 | End: 2017-01-01 | Stop reason: SDUPTHER

## 2017-01-01 RX ORDER — CHLORHEXIDINE GLUCONATE 1.2 MG/ML
15 RINSE ORAL EVERY 12 HOURS
Status: DISCONTINUED | OUTPATIENT
Start: 2017-01-01 | End: 2017-01-01 | Stop reason: HOSPADM

## 2017-01-01 RX ORDER — PROPOFOL 10 MG/ML
5-50 VIAL (ML) INTRAVENOUS
Status: DISCONTINUED | OUTPATIENT
Start: 2017-01-01 | End: 2017-01-01

## 2017-01-01 RX ORDER — FENTANYL CITRATE 50 UG/ML
25-50 INJECTION, SOLUTION INTRAMUSCULAR; INTRAVENOUS
Status: DISCONTINUED | OUTPATIENT
Start: 2017-01-01 | End: 2017-01-01

## 2017-01-01 RX ORDER — PAROXETINE 10 MG/1
10 TABLET, FILM COATED ORAL DAILY
COMMUNITY

## 2017-01-01 RX ORDER — POTASSIUM CHLORIDE 1.5 G/1.77G
20 POWDER, FOR SOLUTION ORAL
Status: COMPLETED | OUTPATIENT
Start: 2017-01-01 | End: 2017-01-01

## 2017-01-01 RX ORDER — PHENYLEPHRINE HYDROCHLORIDE 10 MG/ML
0-200 INJECTION INTRAVENOUS AS NEEDED
Status: DISCONTINUED | OUTPATIENT
Start: 2017-01-01 | End: 2017-01-01

## 2017-01-01 RX ORDER — SODIUM CHLORIDE 0.9 % (FLUSH) 0.9 %
5-10 SYRINGE (ML) INJECTION AS NEEDED
Status: DISCONTINUED | OUTPATIENT
Start: 2017-01-01 | End: 2017-01-01 | Stop reason: SDUPTHER

## 2017-01-01 RX ORDER — ATORVASTATIN CALCIUM 40 MG/1
80 TABLET, FILM COATED ORAL
Status: DISCONTINUED | OUTPATIENT
Start: 2017-01-01 | End: 2017-01-01

## 2017-01-01 RX ORDER — DOCUSATE SODIUM 100 MG/1
100 CAPSULE, LIQUID FILLED ORAL 2 TIMES DAILY
Status: DISCONTINUED | OUTPATIENT
Start: 2017-01-01 | End: 2017-01-01

## 2017-01-01 RX ORDER — MIDAZOLAM HYDROCHLORIDE 1 MG/ML
.5-2 INJECTION, SOLUTION INTRAMUSCULAR; INTRAVENOUS
Status: DISCONTINUED | OUTPATIENT
Start: 2017-01-01 | End: 2017-01-01

## 2017-01-01 RX ORDER — HEPARIN SODIUM 200 [USP'U]/100ML
500 INJECTION, SOLUTION INTRAVENOUS ONCE
Status: COMPLETED | OUTPATIENT
Start: 2017-01-01 | End: 2017-01-01

## 2017-01-01 RX ORDER — VERAPAMIL HYDROCHLORIDE 2.5 MG/ML
INJECTION, SOLUTION INTRAVENOUS
Status: COMPLETED
Start: 2017-01-01 | End: 2017-01-01

## 2017-01-01 RX ORDER — PROPOFOL 10 MG/ML
INJECTION, EMULSION INTRAVENOUS
Status: COMPLETED
Start: 2017-01-01 | End: 2017-01-01

## 2017-01-01 RX ORDER — ALPRAZOLAM 0.25 MG/1
0.25 TABLET ORAL
COMMUNITY

## 2017-01-01 RX ORDER — SODIUM CHLORIDE 9 MG/ML
25 INJECTION, SOLUTION INTRAVENOUS CONTINUOUS
Status: DISCONTINUED | OUTPATIENT
Start: 2017-01-01 | End: 2017-01-01

## 2017-01-01 RX ORDER — NALOXONE HYDROCHLORIDE 0.4 MG/ML
0.4 INJECTION, SOLUTION INTRAMUSCULAR; INTRAVENOUS; SUBCUTANEOUS AS NEEDED
Status: DISCONTINUED | OUTPATIENT
Start: 2017-01-01 | End: 2017-01-01

## 2017-01-01 RX ORDER — EPTIFIBATIDE 0.75 MG/ML
INJECTION, SOLUTION INTRAVENOUS
Status: COMPLETED
Start: 2017-01-01 | End: 2017-01-01

## 2017-01-01 RX ORDER — HYDROCODONE BITARTRATE AND ACETAMINOPHEN 5; 325 MG/1; MG/1
1 TABLET ORAL
Status: DISCONTINUED | OUTPATIENT
Start: 2017-01-01 | End: 2017-01-01

## 2017-01-01 RX ORDER — ACETAMINOPHEN 10 MG/ML
1000 INJECTION, SOLUTION INTRAVENOUS
Status: DISPENSED | OUTPATIENT
Start: 2017-01-01 | End: 2017-01-01

## 2017-01-01 RX ORDER — LISINOPRIL 5 MG/1
2.5 TABLET ORAL DAILY
Status: DISCONTINUED | OUTPATIENT
Start: 2017-01-01 | End: 2017-01-01 | Stop reason: HOSPADM

## 2017-01-01 RX ORDER — VERAPAMIL HYDROCHLORIDE 2.5 MG/ML
2.5 INJECTION, SOLUTION INTRAVENOUS ONCE
Status: COMPLETED | OUTPATIENT
Start: 2017-01-01 | End: 2017-01-01

## 2017-01-01 RX ORDER — FUROSEMIDE 10 MG/ML
20 INJECTION INTRAMUSCULAR; INTRAVENOUS ONCE
Status: COMPLETED | OUTPATIENT
Start: 2017-01-01 | End: 2017-01-01

## 2017-01-01 RX ORDER — LORAZEPAM 2 MG/ML
2-5 INJECTION INTRAMUSCULAR
Status: DISCONTINUED | OUTPATIENT
Start: 2017-01-01 | End: 2017-01-01 | Stop reason: HOSPADM

## 2017-01-01 RX ORDER — POTASSIUM CHLORIDE 7.45 MG/ML
10 INJECTION INTRAVENOUS
Status: COMPLETED | OUTPATIENT
Start: 2017-01-01 | End: 2017-01-01

## 2017-01-01 RX ORDER — BIVALIRUDIN 250 MG/5ML
INJECTION, POWDER, LYOPHILIZED, FOR SOLUTION INTRAVENOUS
Status: DISCONTINUED
Start: 2017-01-01 | End: 2017-01-01 | Stop reason: HOSPADM

## 2017-01-01 RX ORDER — DEXTROSE MONOHYDRATE 50 MG/ML
50 INJECTION, SOLUTION INTRAVENOUS CONTINUOUS
Status: DISCONTINUED | OUTPATIENT
Start: 2017-01-01 | End: 2017-01-01 | Stop reason: HOSPADM

## 2017-01-01 RX ORDER — PHENYLEPHRINE HYDROCHLORIDE 10 MG/ML
INJECTION INTRAVENOUS
Status: DISCONTINUED
Start: 2017-01-01 | End: 2017-01-01 | Stop reason: HOSPADM

## 2017-01-01 RX ORDER — POTASSIUM CHLORIDE 7.45 MG/ML
10 INJECTION INTRAVENOUS
Status: DISCONTINUED | OUTPATIENT
Start: 2017-01-01 | End: 2017-01-01

## 2017-01-01 RX ORDER — POTASSIUM CHLORIDE 7.45 MG/ML
10 INJECTION INTRAVENOUS
Status: DISPENSED | OUTPATIENT
Start: 2017-01-01 | End: 2017-01-01

## 2017-01-01 RX ORDER — MORPHINE SULFATE 4 MG/ML
4 INJECTION, SOLUTION INTRAMUSCULAR; INTRAVENOUS
Status: DISCONTINUED | OUTPATIENT
Start: 2017-01-01 | End: 2017-01-01 | Stop reason: HOSPADM

## 2017-01-01 RX ORDER — HEPARIN SODIUM 1000 [USP'U]/ML
INJECTION, SOLUTION INTRAVENOUS; SUBCUTANEOUS
Status: COMPLETED
Start: 2017-01-01 | End: 2017-01-01

## 2017-01-01 RX ORDER — MIDAZOLAM HYDROCHLORIDE 1 MG/ML
INJECTION, SOLUTION INTRAMUSCULAR; INTRAVENOUS
Status: COMPLETED
Start: 2017-01-01 | End: 2017-01-01

## 2017-01-01 RX ORDER — MUPIROCIN 20 MG/G
OINTMENT TOPICAL 2 TIMES DAILY
Status: COMPLETED | OUTPATIENT
Start: 2017-01-01 | End: 2017-01-01

## 2017-01-01 RX ORDER — ONDANSETRON 2 MG/ML
4 INJECTION INTRAMUSCULAR; INTRAVENOUS
Status: DISCONTINUED | OUTPATIENT
Start: 2017-01-01 | End: 2017-01-01 | Stop reason: HOSPADM

## 2017-01-01 RX ORDER — IPRATROPIUM BROMIDE AND ALBUTEROL SULFATE 2.5; .5 MG/3ML; MG/3ML
3 SOLUTION RESPIRATORY (INHALATION)
Status: DISCONTINUED | OUTPATIENT
Start: 2017-01-01 | End: 2017-01-01

## 2017-01-01 RX ORDER — METOPROLOL TARTRATE 25 MG/1
12.5 TABLET, FILM COATED ORAL EVERY 12 HOURS
Status: DISCONTINUED | OUTPATIENT
Start: 2017-01-01 | End: 2017-01-01

## 2017-01-01 RX ORDER — SODIUM CHLORIDE 0.9 % (FLUSH) 0.9 %
5-10 SYRINGE (ML) INJECTION EVERY 8 HOURS
Status: DISCONTINUED | OUTPATIENT
Start: 2017-01-01 | End: 2017-01-01 | Stop reason: HOSPADM

## 2017-01-01 RX ORDER — LORAZEPAM 2 MG/ML
2 INJECTION INTRAMUSCULAR ONCE
Status: COMPLETED | OUTPATIENT
Start: 2017-01-01 | End: 2017-01-01

## 2017-01-01 RX ORDER — SODIUM CHLORIDE 0.9 % (FLUSH) 0.9 %
5-10 SYRINGE (ML) INJECTION AS NEEDED
Status: DISCONTINUED | OUTPATIENT
Start: 2017-01-01 | End: 2017-01-01 | Stop reason: HOSPADM

## 2017-01-01 RX ORDER — ACETAMINOPHEN 10 MG/ML
1000 INJECTION, SOLUTION INTRAVENOUS
Status: DISCONTINUED | OUTPATIENT
Start: 2017-01-01 | End: 2017-01-01 | Stop reason: HOSPADM

## 2017-01-01 RX ORDER — PROPOFOL 10 MG/ML
5-50 VIAL (ML) INTRAVENOUS
Status: DISCONTINUED | OUTPATIENT
Start: 2017-01-01 | End: 2017-01-01 | Stop reason: HOSPADM

## 2017-01-01 RX ORDER — ASPIRIN 81 MG/1
81 TABLET ORAL DAILY
Status: DISCONTINUED | OUTPATIENT
Start: 2017-01-01 | End: 2017-01-01 | Stop reason: HOSPADM

## 2017-01-01 RX ADMIN — POTASSIUM CHLORIDE 10 MEQ: 10 INJECTION, SOLUTION INTRAVENOUS at 11:22

## 2017-01-01 RX ADMIN — IPRATROPIUM BROMIDE AND ALBUTEROL SULFATE 3 ML: .5; 3 SOLUTION RESPIRATORY (INHALATION) at 15:20

## 2017-01-01 RX ADMIN — IPRATROPIUM BROMIDE AND ALBUTEROL SULFATE 3 ML: .5; 3 SOLUTION RESPIRATORY (INHALATION) at 15:07

## 2017-01-01 RX ADMIN — LORAZEPAM 4 MG: 2 INJECTION INTRAMUSCULAR; INTRAVENOUS at 14:50

## 2017-01-01 RX ADMIN — CHLORHEXIDINE GLUCONATE 15 ML: 1.2 RINSE ORAL at 09:05

## 2017-01-01 RX ADMIN — PROPOFOL 25 MCG/KG/MIN: 10 INJECTION, EMULSION INTRAVENOUS at 23:33

## 2017-01-01 RX ADMIN — CHLORHEXIDINE GLUCONATE 15 ML: 1.2 RINSE ORAL at 20:02

## 2017-01-01 RX ADMIN — SODIUM CHLORIDE 25 ML/HR: 900 INJECTION, SOLUTION INTRAVENOUS at 22:51

## 2017-01-01 RX ADMIN — PIPERACILLIN SODIUM,TAZOBACTAM SODIUM 4.5 G: 4; .5 INJECTION, POWDER, FOR SOLUTION INTRAVENOUS at 05:49

## 2017-01-01 RX ADMIN — PROPOFOL 20 MCG/KG/MIN: 10 INJECTION, EMULSION INTRAVENOUS at 12:57

## 2017-01-01 RX ADMIN — POTASSIUM CHLORIDE 10 MEQ: 10 INJECTION, SOLUTION INTRAVENOUS at 12:53

## 2017-01-01 RX ADMIN — LISINOPRIL 2.5 MG: 5 TABLET ORAL at 11:31

## 2017-01-01 RX ADMIN — IOPAMIDOL 80 ML: 755 INJECTION, SOLUTION INTRAVENOUS at 16:55

## 2017-01-01 RX ADMIN — PROPOFOL 50 MCG/KG/MIN: 10 INJECTION, EMULSION INTRAVENOUS at 17:39

## 2017-01-01 RX ADMIN — PROPOFOL 50 MCG/KG/MIN: 10 INJECTION, EMULSION INTRAVENOUS at 18:44

## 2017-01-01 RX ADMIN — PROPOFOL 30 MCG/KG/MIN: 10 INJECTION, EMULSION INTRAVENOUS at 08:56

## 2017-01-01 RX ADMIN — TICAGRELOR 90 MG: 90 TABLET ORAL at 20:02

## 2017-01-01 RX ADMIN — METOPROLOL TARTRATE 25 MG: 25 TABLET ORAL at 20:46

## 2017-01-01 RX ADMIN — EPTIFIBATIDE 2 MCG/KG/MIN: 0.75 INJECTION, SOLUTION INTRAVENOUS at 17:35

## 2017-01-01 RX ADMIN — POTASSIUM CHLORIDE 10 MEQ: 10 INJECTION, SOLUTION INTRAVENOUS at 14:11

## 2017-01-01 RX ADMIN — Medication 10 ML: at 13:32

## 2017-01-01 RX ADMIN — LEVETIRACETAM 500 MG: 100 INJECTION, SOLUTION, CONCENTRATE INTRAVENOUS at 13:29

## 2017-01-01 RX ADMIN — IPRATROPIUM BROMIDE AND ALBUTEROL SULFATE 3 ML: .5; 3 SOLUTION RESPIRATORY (INHALATION) at 07:08

## 2017-01-01 RX ADMIN — POTASSIUM CHLORIDE 10 MEQ: 10 INJECTION, SOLUTION INTRAVENOUS at 10:45

## 2017-01-01 RX ADMIN — PROPOFOL 35 MCG/KG/MIN: 10 INJECTION, EMULSION INTRAVENOUS at 06:04

## 2017-01-01 RX ADMIN — PROPOFOL 50 MCG/KG/MIN: 10 INJECTION, EMULSION INTRAVENOUS at 16:19

## 2017-01-01 RX ADMIN — IOPAMIDOL 120 ML: 755 INJECTION, SOLUTION INTRAVENOUS at 16:31

## 2017-01-01 RX ADMIN — FUROSEMIDE 20 MG: 10 INJECTION, SOLUTION INTRAMUSCULAR; INTRAVENOUS at 09:38

## 2017-01-01 RX ADMIN — LEVETIRACETAM 500 MG: 100 INJECTION, SOLUTION, CONCENTRATE INTRAVENOUS at 20:48

## 2017-01-01 RX ADMIN — PROPOFOL 50 MCG/KG/MIN: 10 INJECTION, EMULSION INTRAVENOUS at 12:58

## 2017-01-01 RX ADMIN — BIVALIRUDIN 1.75 MG/KG/HR: 250 INJECTION, POWDER, LYOPHILIZED, FOR SOLUTION INTRAVENOUS at 16:15

## 2017-01-01 RX ADMIN — PROPOFOL 50 MCG/KG/MIN: 10 INJECTION, EMULSION INTRAVENOUS at 01:20

## 2017-01-01 RX ADMIN — PROPOFOL 50 MCG/KG/MIN: 10 INJECTION, EMULSION INTRAVENOUS at 14:11

## 2017-01-01 RX ADMIN — PROPOFOL 35 MCG/KG/MIN: 10 INJECTION, EMULSION INTRAVENOUS at 02:48

## 2017-01-01 RX ADMIN — PHENYLEPHRINE HYDROCHLORIDE 100 MCG: 10 INJECTION INTRAVENOUS at 16:30

## 2017-01-01 RX ADMIN — ENOXAPARIN SODIUM 40 MG: 40 INJECTION SUBCUTANEOUS at 08:06

## 2017-01-01 RX ADMIN — Medication 10 ML: at 06:02

## 2017-01-01 RX ADMIN — Medication 10 ML: at 21:42

## 2017-01-01 RX ADMIN — Medication 10 ML: at 22:44

## 2017-01-01 RX ADMIN — LEVETIRACETAM 500 MG: 100 INJECTION, SOLUTION, CONCENTRATE INTRAVENOUS at 09:16

## 2017-01-01 RX ADMIN — MUPIROCIN: 20 OINTMENT TOPICAL at 08:01

## 2017-01-01 RX ADMIN — MIDAZOLAM HYDROCHLORIDE 2 MG: 1 INJECTION INTRAMUSCULAR; INTRAVENOUS at 16:10

## 2017-01-01 RX ADMIN — Medication 10 ML: at 14:11

## 2017-01-01 RX ADMIN — MUPIROCIN: 20 OINTMENT TOPICAL at 09:07

## 2017-01-01 RX ADMIN — ACETAMINOPHEN 1000 MG: 10 INJECTION, SOLUTION INTRAVENOUS at 18:51

## 2017-01-01 RX ADMIN — Medication 10 ML: at 21:13

## 2017-01-01 RX ADMIN — MORPHINE SULFATE 4 MG: 4 INJECTION, SOLUTION INTRAMUSCULAR; INTRAVENOUS at 10:37

## 2017-01-01 RX ADMIN — ENOXAPARIN SODIUM 40 MG: 40 INJECTION SUBCUTANEOUS at 10:06

## 2017-01-01 RX ADMIN — TICAGRELOR 90 MG: 90 TABLET ORAL at 22:08

## 2017-01-01 RX ADMIN — TICAGRELOR 180 MG: 90 TABLET ORAL at 20:17

## 2017-01-01 RX ADMIN — CHLORHEXIDINE GLUCONATE 15 ML: 1.2 RINSE ORAL at 08:00

## 2017-01-01 RX ADMIN — IPRATROPIUM BROMIDE AND ALBUTEROL SULFATE 3 ML: .5; 3 SOLUTION RESPIRATORY (INHALATION) at 07:40

## 2017-01-01 RX ADMIN — SODIUM CHLORIDE 40 MG: 9 INJECTION INTRAMUSCULAR; INTRAVENOUS; SUBCUTANEOUS at 08:37

## 2017-01-01 RX ADMIN — PIPERACILLIN SODIUM,TAZOBACTAM SODIUM 4.5 G: 4; .5 INJECTION, POWDER, FOR SOLUTION INTRAVENOUS at 13:11

## 2017-01-01 RX ADMIN — CHLORHEXIDINE GLUCONATE 15 ML: 1.2 RINSE ORAL at 08:38

## 2017-01-01 RX ADMIN — HEPARIN SODIUM 2500 UNITS: 1000 INJECTION, SOLUTION INTRAVENOUS; SUBCUTANEOUS at 16:01

## 2017-01-01 RX ADMIN — LORAZEPAM 2 MG: 2 INJECTION INTRAMUSCULAR; INTRAVENOUS at 11:00

## 2017-01-01 RX ADMIN — METOPROLOL TARTRATE 25 MG: 25 TABLET ORAL at 08:00

## 2017-01-01 RX ADMIN — HEPARIN SODIUM 1000 UNITS: 200 INJECTION, SOLUTION INTRAVENOUS at 16:34

## 2017-01-01 RX ADMIN — MUPIROCIN: 20 OINTMENT TOPICAL at 21:42

## 2017-01-01 RX ADMIN — IPRATROPIUM BROMIDE AND ALBUTEROL SULFATE 3 ML: .5; 3 SOLUTION RESPIRATORY (INHALATION) at 11:25

## 2017-01-01 RX ADMIN — SODIUM CHLORIDE 40 MG: 9 INJECTION INTRAMUSCULAR; INTRAVENOUS; SUBCUTANEOUS at 08:30

## 2017-01-01 RX ADMIN — PROPOFOL 50 MCG/KG/MIN: 10 INJECTION, EMULSION INTRAVENOUS at 14:01

## 2017-01-01 RX ADMIN — CHLORHEXIDINE GLUCONATE 15 ML: 1.2 RINSE ORAL at 20:49

## 2017-01-01 RX ADMIN — METOPROLOL TARTRATE 12.5 MG: 25 TABLET ORAL at 08:31

## 2017-01-01 RX ADMIN — POTASSIUM CHLORIDE 10 MEQ: 10 INJECTION, SOLUTION INTRAVENOUS at 10:06

## 2017-01-01 RX ADMIN — Medication 100 MCG/HR: at 19:48

## 2017-01-01 RX ADMIN — MUPIROCIN: 20 OINTMENT TOPICAL at 20:16

## 2017-01-01 RX ADMIN — SODIUM CHLORIDE 40 MG: 9 INJECTION INTRAMUSCULAR; INTRAVENOUS; SUBCUTANEOUS at 20:07

## 2017-01-01 RX ADMIN — SODIUM CHLORIDE 40 MG: 9 INJECTION INTRAMUSCULAR; INTRAVENOUS; SUBCUTANEOUS at 08:00

## 2017-01-01 RX ADMIN — Medication 10 ML: at 22:48

## 2017-01-01 RX ADMIN — POTASSIUM CHLORIDE 10 MEQ: 10 INJECTION, SOLUTION INTRAVENOUS at 10:04

## 2017-01-01 RX ADMIN — ACETAMINOPHEN 650 MG: 325 SOLUTION ORAL at 01:15

## 2017-01-01 RX ADMIN — PROPOFOL 15 MCG/KG/MIN: 10 INJECTION, EMULSION INTRAVENOUS at 05:44

## 2017-01-01 RX ADMIN — PROPOFOL 50 MCG/KG/MIN: 10 INJECTION, EMULSION INTRAVENOUS at 03:45

## 2017-01-01 RX ADMIN — LISINOPRIL 2.5 MG: 5 TABLET ORAL at 11:07

## 2017-01-01 RX ADMIN — PROPOFOL 50 MCG/KG/MIN: 10 INJECTION, EMULSION INTRAVENOUS at 11:34

## 2017-01-01 RX ADMIN — PIPERACILLIN SODIUM,TAZOBACTAM SODIUM 3.38 G: 3; .375 INJECTION, POWDER, FOR SOLUTION INTRAVENOUS at 21:43

## 2017-01-01 RX ADMIN — POTASSIUM CHLORIDE 10 MEQ: 10 INJECTION, SOLUTION INTRAVENOUS at 15:07

## 2017-01-01 RX ADMIN — LEVETIRACETAM 500 MG: 100 INJECTION, SOLUTION, CONCENTRATE INTRAVENOUS at 08:01

## 2017-01-01 RX ADMIN — PROPOFOL 50 MCG/KG/MIN: 10 INJECTION, EMULSION INTRAVENOUS at 22:23

## 2017-01-01 RX ADMIN — TICAGRELOR 90 MG: 90 TABLET ORAL at 08:00

## 2017-01-01 RX ADMIN — Medication 50 MCG/HR: at 02:34

## 2017-01-01 RX ADMIN — EPTIFIBATIDE 22.43 MG: 0.75 INJECTION, SOLUTION INTRAVENOUS at 16:47

## 2017-01-01 RX ADMIN — TICAGRELOR 90 MG: 90 TABLET ORAL at 06:31

## 2017-01-01 RX ADMIN — PIPERACILLIN AND TAZOBACTAM 4.5 G: 4; .5 INJECTION, POWDER, FOR SOLUTION INTRAVENOUS at 09:05

## 2017-01-01 RX ADMIN — PIPERACILLIN SODIUM,TAZOBACTAM SODIUM 3.38 G: 3; .375 INJECTION, POWDER, FOR SOLUTION INTRAVENOUS at 05:33

## 2017-01-01 RX ADMIN — Medication 10 ML: at 13:11

## 2017-01-01 RX ADMIN — Medication 10 ML: at 13:01

## 2017-01-01 RX ADMIN — ACETAMINOPHEN 1000 MG: 10 INJECTION, SOLUTION INTRAVENOUS at 08:43

## 2017-01-01 RX ADMIN — PROPOFOL 1000 MG: 10 INJECTION, EMULSION INTRAVENOUS at 17:42

## 2017-01-01 RX ADMIN — SODIUM CHLORIDE 40 MG: 9 INJECTION INTRAMUSCULAR; INTRAVENOUS; SUBCUTANEOUS at 21:38

## 2017-01-01 RX ADMIN — CHLORHEXIDINE GLUCONATE 15 ML: 1.2 RINSE ORAL at 08:26

## 2017-01-01 RX ADMIN — SODIUM CHLORIDE 40 MG: 9 INJECTION INTRAMUSCULAR; INTRAVENOUS; SUBCUTANEOUS at 20:48

## 2017-01-01 RX ADMIN — POTASSIUM CHLORIDE 10 MEQ: 10 INJECTION, SOLUTION INTRAVENOUS at 11:46

## 2017-01-01 RX ADMIN — MORPHINE SULFATE 4 MG: 4 INJECTION, SOLUTION INTRAMUSCULAR; INTRAVENOUS at 04:12

## 2017-01-01 RX ADMIN — CHLORHEXIDINE GLUCONATE 15 ML: 1.2 RINSE ORAL at 21:41

## 2017-01-01 RX ADMIN — PROPOFOL 50 MCG/KG/MIN: 10 INJECTION, EMULSION INTRAVENOUS at 20:13

## 2017-01-01 RX ADMIN — SODIUM CHLORIDE 40 MG: 9 INJECTION INTRAMUSCULAR; INTRAVENOUS; SUBCUTANEOUS at 09:05

## 2017-01-01 RX ADMIN — PIPERACILLIN SODIUM,TAZOBACTAM SODIUM 3.38 G: 3; .375 INJECTION, POWDER, FOR SOLUTION INTRAVENOUS at 16:54

## 2017-01-01 RX ADMIN — POTASSIUM CHLORIDE 10 MEQ: 10 INJECTION, SOLUTION INTRAVENOUS at 08:46

## 2017-01-01 RX ADMIN — LORAZEPAM 2 MG: 2 INJECTION INTRAMUSCULAR; INTRAVENOUS at 01:24

## 2017-01-01 RX ADMIN — PROPOFOL 50 MCG/KG/MIN: 10 INJECTION, EMULSION INTRAVENOUS at 16:17

## 2017-01-01 RX ADMIN — LORAZEPAM 4 MG: 2 INJECTION INTRAMUSCULAR; INTRAVENOUS at 16:25

## 2017-01-01 RX ADMIN — LEVETIRACETAM 500 MG: 100 INJECTION, SOLUTION, CONCENTRATE INTRAVENOUS at 09:11

## 2017-01-01 RX ADMIN — Medication 10 ML: at 21:47

## 2017-01-01 RX ADMIN — Medication 10 ML: at 13:12

## 2017-01-01 RX ADMIN — PIPERACILLIN SODIUM,TAZOBACTAM SODIUM 3.38 G: 3; .375 INJECTION, POWDER, FOR SOLUTION INTRAVENOUS at 13:29

## 2017-01-01 RX ADMIN — Medication 75 MCG/HR: at 14:19

## 2017-01-01 RX ADMIN — LEVETIRACETAM 500 MG: 100 INJECTION, SOLUTION, CONCENTRATE INTRAVENOUS at 21:45

## 2017-01-01 RX ADMIN — MORPHINE SULFATE 4 MG: 4 INJECTION, SOLUTION INTRAMUSCULAR; INTRAVENOUS at 14:50

## 2017-01-01 RX ADMIN — CHLORHEXIDINE GLUCONATE 15 ML: 1.2 RINSE ORAL at 20:48

## 2017-01-01 RX ADMIN — Medication 10 ML: at 14:54

## 2017-01-01 RX ADMIN — Medication 10 ML: at 05:39

## 2017-01-01 RX ADMIN — LEVETIRACETAM 500 MG: 100 INJECTION, SOLUTION, CONCENTRATE INTRAVENOUS at 08:00

## 2017-01-01 RX ADMIN — BIVALIRUDIN 0.5 MG/KG/HR: 250 INJECTION, POWDER, LYOPHILIZED, FOR SOLUTION INTRAVENOUS at 16:54

## 2017-01-01 RX ADMIN — MUPIROCIN: 20 OINTMENT TOPICAL at 08:00

## 2017-01-01 RX ADMIN — PIPERACILLIN SODIUM,TAZOBACTAM SODIUM 4.5 G: 4; .5 INJECTION, POWDER, FOR SOLUTION INTRAVENOUS at 21:37

## 2017-01-01 RX ADMIN — VERAPAMIL HYDROCHLORIDE 2.5 MG: 2.5 INJECTION, SOLUTION INTRAVENOUS at 16:01

## 2017-01-01 RX ADMIN — IPRATROPIUM BROMIDE AND ALBUTEROL SULFATE 3 ML: .5; 3 SOLUTION RESPIRATORY (INHALATION) at 07:27

## 2017-01-01 RX ADMIN — POTASSIUM CHLORIDE 10 MEQ: 10 INJECTION, SOLUTION INTRAVENOUS at 11:59

## 2017-01-01 RX ADMIN — PROPOFOL 35 MCG/KG/MIN: 10 INJECTION, EMULSION INTRAVENOUS at 18:27

## 2017-01-01 RX ADMIN — Medication 100 MCG/HR: at 11:07

## 2017-01-01 RX ADMIN — PROPOFOL 50 MCG/KG/MIN: 10 INJECTION, EMULSION INTRAVENOUS at 04:12

## 2017-01-01 RX ADMIN — PIPERACILLIN SODIUM,TAZOBACTAM SODIUM 3.38 G: 3; .375 INJECTION, POWDER, FOR SOLUTION INTRAVENOUS at 21:55

## 2017-01-01 RX ADMIN — Medication 10 ML: at 06:20

## 2017-01-01 RX ADMIN — PROPOFOL 35 MCG/KG/MIN: 10 INJECTION, EMULSION INTRAVENOUS at 22:30

## 2017-01-01 RX ADMIN — Medication 50 MCG/HR: at 20:55

## 2017-01-01 RX ADMIN — PROPOFOL 50 MCG/KG/MIN: 10 INJECTION, EMULSION INTRAVENOUS at 02:41

## 2017-01-01 RX ADMIN — POTASSIUM CHLORIDE 10 MEQ: 10 INJECTION, SOLUTION INTRAVENOUS at 10:21

## 2017-01-01 RX ADMIN — CHLORHEXIDINE GLUCONATE 15 ML: 1.2 RINSE ORAL at 20:16

## 2017-01-01 RX ADMIN — TICAGRELOR 90 MG: 90 TABLET ORAL at 09:48

## 2017-01-01 RX ADMIN — ASPIRIN 81 MG: 81 TABLET, COATED ORAL at 08:31

## 2017-01-01 RX ADMIN — SODIUM CHLORIDE 40 MG: 9 INJECTION INTRAMUSCULAR; INTRAVENOUS; SUBCUTANEOUS at 20:25

## 2017-01-01 RX ADMIN — LISINOPRIL 2.5 MG: 5 TABLET ORAL at 11:01

## 2017-01-01 RX ADMIN — PIPERACILLIN SODIUM,TAZOBACTAM SODIUM 3.38 G: 3; .375 INJECTION, POWDER, FOR SOLUTION INTRAVENOUS at 21:13

## 2017-01-01 RX ADMIN — ACETAMINOPHEN 1000 MG: 10 INJECTION, SOLUTION INTRAVENOUS at 16:44

## 2017-01-01 RX ADMIN — PROPOFOL 15 MCG/KG/MIN: 10 INJECTION, EMULSION INTRAVENOUS at 01:05

## 2017-01-01 RX ADMIN — SODIUM CHLORIDE 100 ML/HR: 900 INJECTION, SOLUTION INTRAVENOUS at 06:21

## 2017-01-01 RX ADMIN — PIPERACILLIN SODIUM,TAZOBACTAM SODIUM 3.38 G: 3; .375 INJECTION, POWDER, FOR SOLUTION INTRAVENOUS at 06:02

## 2017-01-01 RX ADMIN — POTASSIUM CHLORIDE 10 MEQ: 10 INJECTION, SOLUTION INTRAVENOUS at 13:04

## 2017-01-01 RX ADMIN — IPRATROPIUM BROMIDE AND ALBUTEROL SULFATE 3 ML: .5; 3 SOLUTION RESPIRATORY (INHALATION) at 08:03

## 2017-01-01 RX ADMIN — SODIUM CHLORIDE 40 MG: 9 INJECTION INTRAMUSCULAR; INTRAVENOUS; SUBCUTANEOUS at 20:49

## 2017-01-01 RX ADMIN — CHLORHEXIDINE GLUCONATE 15 ML: 1.2 RINSE ORAL at 08:02

## 2017-01-01 RX ADMIN — ASPIRIN 81 MG: 81 TABLET, COATED ORAL at 08:00

## 2017-01-01 RX ADMIN — PROPOFOL 50 MCG/KG/MIN: 10 INJECTION, EMULSION INTRAVENOUS at 05:12

## 2017-01-01 RX ADMIN — POTASSIUM CHLORIDE 10 MEQ: 10 INJECTION, SOLUTION INTRAVENOUS at 11:53

## 2017-01-01 RX ADMIN — TICAGRELOR 90 MG: 90 TABLET ORAL at 08:06

## 2017-01-01 RX ADMIN — ENOXAPARIN SODIUM 40 MG: 40 INJECTION SUBCUTANEOUS at 10:34

## 2017-01-01 RX ADMIN — TICAGRELOR 90 MG: 90 TABLET ORAL at 20:25

## 2017-01-01 RX ADMIN — EPTIFIBATIDE 75000 MCG: 0.75 INJECTION, SOLUTION INTRAVENOUS at 22:33

## 2017-01-01 RX ADMIN — PROPOFOL 50 MCG/KG/MIN: 10 INJECTION, EMULSION INTRAVENOUS at 22:20

## 2017-01-01 RX ADMIN — PROPOFOL 25 MCG/KG/MIN: 10 INJECTION, EMULSION INTRAVENOUS at 20:02

## 2017-01-01 RX ADMIN — SODIUM CHLORIDE 100 ML/HR: 900 INJECTION, SOLUTION INTRAVENOUS at 08:46

## 2017-01-01 RX ADMIN — IPRATROPIUM BROMIDE AND ALBUTEROL SULFATE 3 ML: .5; 3 SOLUTION RESPIRATORY (INHALATION) at 19:50

## 2017-01-01 RX ADMIN — FUROSEMIDE 40 MG: 10 INJECTION, SOLUTION INTRAMUSCULAR; INTRAVENOUS at 08:31

## 2017-01-01 RX ADMIN — LORAZEPAM 2 MG: 2 INJECTION INTRAMUSCULAR; INTRAVENOUS at 12:59

## 2017-01-01 RX ADMIN — PIPERACILLIN SODIUM,TAZOBACTAM SODIUM 3.38 G: 3; .375 INJECTION, POWDER, FOR SOLUTION INTRAVENOUS at 21:10

## 2017-01-01 RX ADMIN — MUPIROCIN: 20 OINTMENT TOPICAL at 20:49

## 2017-01-01 RX ADMIN — LEVETIRACETAM 1000 MG: 100 INJECTION, SOLUTION, CONCENTRATE INTRAVENOUS at 08:33

## 2017-01-01 RX ADMIN — Medication 10 ML: at 21:00

## 2017-01-01 RX ADMIN — PROPOFOL 50 MCG/KG/MIN: 10 INJECTION, EMULSION INTRAVENOUS at 09:17

## 2017-01-01 RX ADMIN — IPRATROPIUM BROMIDE AND ALBUTEROL SULFATE 3 ML: .5; 3 SOLUTION RESPIRATORY (INHALATION) at 11:19

## 2017-01-01 RX ADMIN — ATORVASTATIN CALCIUM 80 MG: 40 TABLET, FILM COATED ORAL at 22:00

## 2017-01-01 RX ADMIN — MUPIROCIN: 20 OINTMENT TOPICAL at 08:37

## 2017-01-01 RX ADMIN — PROPOFOL 40 MCG/KG/MIN: 10 INJECTION, EMULSION INTRAVENOUS at 11:31

## 2017-01-01 RX ADMIN — IPRATROPIUM BROMIDE AND ALBUTEROL SULFATE 3 ML: .5; 3 SOLUTION RESPIRATORY (INHALATION) at 11:26

## 2017-01-01 RX ADMIN — Medication 10 ML: at 05:34

## 2017-01-01 RX ADMIN — PROPOFOL 20 MCG/KG/MIN: 10 INJECTION, EMULSION INTRAVENOUS at 03:30

## 2017-01-01 RX ADMIN — TICAGRELOR 90 MG: 90 TABLET ORAL at 08:37

## 2017-01-01 RX ADMIN — POTASSIUM CHLORIDE 20 MEQ: 1.5 POWDER, FOR SOLUTION ORAL at 09:38

## 2017-01-01 RX ADMIN — IPRATROPIUM BROMIDE AND ALBUTEROL SULFATE 3 ML: .5; 3 SOLUTION RESPIRATORY (INHALATION) at 11:17

## 2017-01-01 RX ADMIN — LEVETIRACETAM 500 MG: 100 INJECTION, SOLUTION, CONCENTRATE INTRAVENOUS at 20:25

## 2017-01-01 RX ADMIN — LORAZEPAM 2 MG: 2 INJECTION, SOLUTION INTRAMUSCULAR; INTRAVENOUS at 20:38

## 2017-01-01 RX ADMIN — POTASSIUM CHLORIDE 10 MEQ: 10 INJECTION, SOLUTION INTRAVENOUS at 08:37

## 2017-01-01 RX ADMIN — Medication 10 ML: at 06:21

## 2017-01-01 RX ADMIN — PIPERACILLIN SODIUM,TAZOBACTAM SODIUM 3.38 G: 3; .375 INJECTION, POWDER, FOR SOLUTION INTRAVENOUS at 13:11

## 2017-01-01 RX ADMIN — POTASSIUM CHLORIDE 10 MEQ: 10 INJECTION, SOLUTION INTRAVENOUS at 12:52

## 2017-01-01 RX ADMIN — IPRATROPIUM BROMIDE AND ALBUTEROL SULFATE 3 ML: .5; 3 SOLUTION RESPIRATORY (INHALATION) at 20:35

## 2017-01-01 RX ADMIN — PROPOFOL 50 MCG/KG/MIN: 10 INJECTION, EMULSION INTRAVENOUS at 11:50

## 2017-01-01 RX ADMIN — EPTIFIBATIDE 2 MCG/KG/MIN: 0.75 INJECTION, SOLUTION INTRAVENOUS at 16:58

## 2017-01-01 RX ADMIN — ASPIRIN 81 MG: 81 TABLET, COATED ORAL at 09:05

## 2017-01-01 RX ADMIN — PROPOFOL 35 MCG/KG/MIN: 10 INJECTION, EMULSION INTRAVENOUS at 17:16

## 2017-01-01 RX ADMIN — LORAZEPAM 3 MG: 2 INJECTION INTRAMUSCULAR; INTRAVENOUS at 10:36

## 2017-01-01 RX ADMIN — PROPOFOL 50 MCG/KG/MIN: 10 INJECTION, EMULSION INTRAVENOUS at 00:51

## 2017-01-01 RX ADMIN — Medication 100 MCG/HR: at 05:16

## 2017-01-01 RX ADMIN — PIPERACILLIN SODIUM,TAZOBACTAM SODIUM 3.38 G: 3; .375 INJECTION, POWDER, FOR SOLUTION INTRAVENOUS at 13:00

## 2017-01-01 RX ADMIN — CHLORHEXIDINE GLUCONATE 15 ML: 1.2 RINSE ORAL at 20:25

## 2017-01-01 RX ADMIN — ACETAMINOPHEN 1000 MG: 10 INJECTION, SOLUTION INTRAVENOUS at 06:19

## 2017-01-01 RX ADMIN — TICAGRELOR 90 MG: 90 TABLET ORAL at 20:46

## 2017-01-01 RX ADMIN — LORAZEPAM 2 MG: 2 INJECTION INTRAMUSCULAR; INTRAVENOUS at 10:17

## 2017-01-01 RX ADMIN — PROPOFOL 50 MCG/KG/MIN: 10 INJECTION, EMULSION INTRAVENOUS at 06:20

## 2017-01-01 RX ADMIN — PROPOFOL 20 MCG/KG/MIN: 10 INJECTION, EMULSION INTRAVENOUS at 21:26

## 2017-01-01 RX ADMIN — LEVETIRACETAM 500 MG: 100 INJECTION, SOLUTION, CONCENTRATE INTRAVENOUS at 20:49

## 2017-01-01 RX ADMIN — METOPROLOL TARTRATE 12.5 MG: 25 TABLET ORAL at 20:03

## 2017-01-01 RX ADMIN — Medication 10 ML: at 05:50

## 2017-01-01 RX ADMIN — IOPAMIDOL 54 ML: 755 INJECTION, SOLUTION INTRAVENOUS at 16:52

## 2017-01-01 RX ADMIN — MUPIROCIN: 20 OINTMENT TOPICAL at 21:03

## 2017-01-01 RX ADMIN — METOPROLOL TARTRATE 25 MG: 25 TABLET ORAL at 08:37

## 2017-01-01 RX ADMIN — MUPIROCIN: 20 OINTMENT TOPICAL at 20:26

## 2017-01-01 RX ADMIN — IPRATROPIUM BROMIDE AND ALBUTEROL SULFATE 3 ML: .5; 3 SOLUTION RESPIRATORY (INHALATION) at 21:41

## 2017-01-01 RX ADMIN — ENOXAPARIN SODIUM 40 MG: 40 INJECTION SUBCUTANEOUS at 09:57

## 2017-01-01 RX ADMIN — POTASSIUM CHLORIDE 10 MEQ: 10 INJECTION, SOLUTION INTRAVENOUS at 10:24

## 2017-01-01 RX ADMIN — MORPHINE SULFATE 4 MG: 4 INJECTION, SOLUTION INTRAMUSCULAR; INTRAVENOUS at 11:51

## 2017-01-01 RX ADMIN — PROPOFOL 50 MCG/KG/MIN: 10 INJECTION, EMULSION INTRAVENOUS at 19:14

## 2017-01-01 RX ADMIN — LEVETIRACETAM 1000 MG: 100 INJECTION, SOLUTION, CONCENTRATE INTRAVENOUS at 20:16

## 2017-01-01 RX ADMIN — Medication 200 MCG/HR: at 14:21

## 2017-01-01 RX ADMIN — POTASSIUM CHLORIDE 10 MEQ: 10 INJECTION, SOLUTION INTRAVENOUS at 09:05

## 2017-01-01 RX ADMIN — PROPOFOL 50 MCG/KG/MIN: 10 INJECTION, EMULSION INTRAVENOUS at 16:47

## 2017-01-01 RX ADMIN — PROPOFOL 40 MCG/KG/MIN: 10 INJECTION, EMULSION INTRAVENOUS at 17:29

## 2017-01-01 RX ADMIN — NITROGLYCERIN 200 MCG: 5 INJECTION, SOLUTION INTRAVENOUS at 16:01

## 2017-01-01 RX ADMIN — IPRATROPIUM BROMIDE AND ALBUTEROL SULFATE 3 ML: .5; 3 SOLUTION RESPIRATORY (INHALATION) at 19:55

## 2017-01-01 RX ADMIN — MIDAZOLAM HYDROCHLORIDE 2 MG: 1 INJECTION, SOLUTION INTRAMUSCULAR; INTRAVENOUS at 16:10

## 2017-01-01 RX ADMIN — IPRATROPIUM BROMIDE AND ALBUTEROL SULFATE 3 ML: .5; 3 SOLUTION RESPIRATORY (INHALATION) at 15:29

## 2017-01-01 RX ADMIN — POTASSIUM CHLORIDE 10 MEQ: 10 INJECTION, SOLUTION INTRAVENOUS at 09:19

## 2017-01-01 RX ADMIN — TICAGRELOR 90 MG: 90 TABLET ORAL at 09:46

## 2017-01-01 RX ADMIN — Medication 10 ML: at 05:52

## 2017-01-01 RX ADMIN — IPRATROPIUM BROMIDE AND ALBUTEROL SULFATE 3 ML: .5; 3 SOLUTION RESPIRATORY (INHALATION) at 11:53

## 2017-01-01 RX ADMIN — Medication 10 ML: at 14:01

## 2017-01-01 RX ADMIN — VERAPAMIL HYDROCHLORIDE 2.5 MG: 2.5 INJECTION INTRAVENOUS at 16:01

## 2017-01-01 RX ADMIN — PIPERACILLIN SODIUM,TAZOBACTAM SODIUM 3.38 G: 3; .375 INJECTION, POWDER, FOR SOLUTION INTRAVENOUS at 05:39

## 2017-01-01 RX ADMIN — METOPROLOL TARTRATE 12.5 MG: 25 TABLET ORAL at 09:38

## 2017-01-01 RX ADMIN — TICAGRELOR 90 MG: 90 TABLET ORAL at 19:57

## 2017-01-01 RX ADMIN — METOPROLOL TARTRATE 25 MG: 25 TABLET ORAL at 20:26

## 2017-01-01 RX ADMIN — ASPIRIN 81 MG: 81 TABLET, COATED ORAL at 08:37

## 2017-01-01 RX ADMIN — POTASSIUM CHLORIDE 10 MEQ: 10 INJECTION, SOLUTION INTRAVENOUS at 08:30

## 2017-01-01 RX ADMIN — PIPERACILLIN SODIUM,TAZOBACTAM SODIUM 3.38 G: 3; .375 INJECTION, POWDER, FOR SOLUTION INTRAVENOUS at 06:20

## 2017-01-01 RX ADMIN — IPRATROPIUM BROMIDE AND ALBUTEROL SULFATE 3 ML: .5; 3 SOLUTION RESPIRATORY (INHALATION) at 15:56

## 2017-01-01 RX ADMIN — ACETAMINOPHEN 650 MG: 325 SOLUTION ORAL at 17:14

## 2017-05-09 PROBLEM — I21.3 STEMI (ST ELEVATION MYOCARDIAL INFARCTION) (HCC): Status: ACTIVE | Noted: 2017-01-01

## 2017-05-09 NOTE — PROGRESS NOTES
5/9/2017    INTENSIVIST PROGRESS NOTE:     Patient seen and evaluated   49 yo male out of hospital arrest  Transferred to ED [facility], Dx'd with a STEMI, taken to cath lab for intervention  Now back in ccu unresponsive, intubated       Visit Vitals    Pulse 75    Resp 10    Ht 5' 10\" (1.778 m)    Wt 124.7 kg (275 lb)    SpO2 100%    BMI 39.46 kg/m2       General: comatose, unresponsive  CV: RRR  Lungs: clear    NO CXR    NO LABS    A/P:  Vent support  No need for pressors for now  Post AMI care per cardiology  PUD/DVT prophylaxis  Will need neuro eval in am  Sedation as needed  Will assist on disposition planning if pt recovers  Nadege Craft MD
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
## 2017-05-09 NOTE — ED NOTES
Patient arrived by EMS with ROSC after cardiac arrest with STEMI. Dr. Sheryl Figueroa at bedside prior to patient arriving and gave verbal orders to take patient straight to cath lab. Cath lab notified.   Pt taken up to cath lab on EMS stretcher accompanied by Dr. Sheryl Figueroa, this nurse, Polina Hall, NISH and EMS team.

## 2017-05-09 NOTE — PROGRESS NOTES
Spiritual Care Assessment/Progress Notes    Oswaldo Rashid [id number]  [id number]    1970  48 y.o.  male    Patient Telephone Number: There is no home phone number on file. Methodist Affiliation:    Language:    No emergency contact information on file. Patient Active Problem List    Diagnosis Date Noted    STEMI (ST elevation myocardial infarction) ([facility] [address] 05/09/2017        Date: 5/9/2017       Level of Methodist/Spiritual Activity:  []         Involved in julia tradition/spiritual practice    []         Not involved in julia tradition/spiritual practice  []         Spiritually oriented    []         Claims no spiritual orientation    []         seeking spiritual identity  []         Feels alienated from Jew practice/tradition  []         Feels angry about Jew practice/tradition  []         Spirituality/Jew tradition   a resource for coping at this time.   [x]         Not able to assess due to medical condition    Services Provided Today:  []         crisis intervention    []         reading Scriptures  []         spiritual assessment    []         prayer  []         empathic listening/emotional support  []         rites and rituals (cite in comments)  []         life review     []         Jew support  []         theological development   []         advocacy  []         ethical dialog     []         blessing  []         bereavement support    []         support to family  []         anticipatory grief support   []         help with AMD  []         spiritual guidance    []         meditation      Spiritual Care Needs  []         Emotional Support  []         Spiritual/Methodist Care  []         Loss/Adjustment  []         Advocacy/Referral                /Ethics  []         No needs expressed at               this time  []         Other: (note in               comments)  [address]  []         Follow up visits with               pt/family  []         Provide materials  []         Schedule sacraments  []         Contact Community               Clergy  []         Follow up as needed  []         Other: (note in               comments)       Comments:  Two gentlemen from patient's workgroup arrived to ER. Accompanied them to CCU waiting area. They shared that patient's partner is en route from [place]. Chaplains available as needed.   MARILU Forde, [facility], [address]     Paging Service  [phone]

## 2017-05-09 NOTE — PROGRESS NOTES
190 Bedside verbal report received from [place] RN     Assessment complete. Patient is intubated and sedated. Pupils are pinpoint but reactive. 2mm in size. No withdrawal to stimulus in lower extremities, but does withdrawal in lower extremities and starts to have tremors. Lung sounds are coarse. Pulses are palpable. Copious amounts of blood being suctioned from ETT and coming from nose and mouth. OG tube in place to low suction. PIV x3. Diprivan infusing @ 50mcg/kg/min, Angiomax @ 12.5 and Integrilin @ 2mcg/kg     Patient having tremor like activity. Concern for seizure activity. Page placed to Cardiology. Copious amounts of blood suctioned from mouth, nose and ETT. Several clots noted. Any stimulus, patient has tremors and eyes roll back in his head.  Spoke with Dr. Brandt Tapia updated on patient condition. Received orders for 2mg Ativan now and to consult with intensivist for additional management of seizure like activity.  Angiomax stopped.  Spoke with CT to correlate time for patient to come down to CT. Agreed on 200. Will call first.     Spoke with Nano Beltran" Ozzie Garner who is the patient's boss, was given a hotel room key for when the patient's significant other Shaheen To) arrives. Milton's contact number [phone] Yesica Monaco (patient's ) arrived. Updated on patient condition. 1407 Patient's takes medication for HTN, Anxiety. Yesica Monaco states one medication is Xanax. Other medication is in the car, will bring in later. Mother, father and brother  young from heart issues. No known allergies. 0000 Assessment complete. No changes from previous. 7700 Paged on call intensivist Dr. Dior Watts about patient's continuing seizure like activity. 26 Spoke with Dr. Dior Watts and updated on patient condition with seizure like activity. Received new orders for Keppra Q12h and if that does not help consult to neurology sooner than in the morning.     [address] [facility] Paged on call neurologist Dr. Neo Hasikns for neuro consult. 8210 Patient is seizing every few minutes. Extremities jerk and eyes roll back in his head. Patient does not withdrawal to pain in any extremities. Babinski is negative. Pupils are still pinpoint but react to light. 0100 Integrilin stopped. 7797 Dr. Neo Haskins in to see patient. 0200 Patient down to CT    0245 2cc of air taken out of TR band. No bleeding noted. 0250 2cc air taken out of TR band. No bleeding    0255 3cc air taken out of TR band. No bleeding. No hematoma. 0300 2cc air taken out of TR band. 0305 2cc air taken out of TR band. TR band now off. Will continue to monitor. 6450 patient now has myoclonic movements of the eyes and head. Movement happens every few minutes. Cousins have arrived and are at the bedside. 0400 Assessment complete. No changed from previous. 6087 Patient does not meet requirements for SAT. Patient is having active seizures. 0600 Patient continues to have myoclonic movement of eyes, head and extremities every few minutes.     0700 bedside verbal report given to Lee Ann, [address] [facility]

## 2017-05-09 NOTE — PROGRESS NOTES
Spiritual Care Assessment/Progress Notes    Paola Villar [id number]  [id number]    1970  48 y.o.  male    Patient Telephone Number: There is no home phone number on file. Mormon Affiliation:    Language:    No emergency contact information on file. There are no active problems to display for this patient. Date: 5/9/2017       Level of Mormon/Spiritual Activity:  []         Involved in julia tradition/spiritual practice    []         Not involved in julai tradition/spiritual practice  []         Spiritually oriented    []         Claims no spiritual orientation    []         seeking spiritual identity  []         Feels alienated from Jewish practice/tradition  []         Feels angry about Jewish practice/tradition  []         Spirituality/Jewish tradition   a resource for coping at this time.   [x]         Not able to assess due to medical condition    Services Provided Today:  []         crisis intervention    []         reading Scriptures  []         spiritual assessment    []         prayer  []         empathic listening/emotional support  []         rites and rituals (cite in comments)  []         life review     []         Jewish support  []         theological development   []         advocacy  []         ethical dialog     []         blessing  []         bereavement support    []         support to family  []         anticipatory grief support   []         help with AMD  []         spiritual guidance    []         meditation      Spiritual Care Needs  []         Emotional Support  []         Spiritual/Mormon Care  []         Loss/Adjustment  []         Advocacy/Referral                /Ethics  []         No needs expressed at               this time  []         Other: (note in               comments)  [address]  []         Follow up visits with               pt/family  []         Provide materials  []         Schedule sacraments  []         Contact [employer]  []         Follow up as needed  []         Other: (note in               comments)     Comments:   Responded to page for patient arriving by EMS in cardiac arrest.  Patient taken immediately to Cath Lab. No family/friends present.  available as needed.   MARILU Johnson, [facility], [address] [place] [address] [address]     Paging Service  [phone]

## 2017-05-09 NOTE — PROGRESS NOTES
TRANSFER - OUT REPORT:    Verbal report given to Neftali Rogers RN(name) on Tianna Yousif  being transferred to CCU(unit) for routine post - op       Report consisted of patients Situation, Background, Assessment and   Recommendations(SBAR). Information from the following report(s) SBAR, Procedure Summary, MAR and Recent Results was reviewed with the receiving nurse. Lines:       Opportunity for questions and clarification was provided.       Patient transported with:   Registered Nurse  Tech

## 2017-05-09 NOTE — PROGRESS NOTES
Cath  R radial  6Fr    100% proximal LAD occlusion  2 GISELE to LAD with reestablished flow to the distal vessel  Mild diffuse disease in RI, LCx, and RCA  EF 40-45%  LV apex is aneurysmal  Mid and distal anterior wall HK  Apical AK/aneurysm  Elevated LVEDP    Integrillin and Bivalirdin until ticagrelor and ASA can be given    FULL REPORT TO FOLLOW
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
## 2017-05-10 NOTE — PROGRESS NOTES
0700  Bedside and verbal report from Cathleen Fenton RN. Patient's spouse and niece at the bedside. 0800  Dr. Awais Collazo here to see patient; spoke with family members at the bedside. 0830  Dr. Reji Mcgee and Dr. Tesfaye Barrios here to see patient. EEG in progress at the bedside. 0900  Echo at bedside. 1000  Family members remain at the bedside; mouth care given for bloody oral secretions. 1100  Vitals stable; remains sedated on propofol. Eyes open spontaneously when propofol off x 5 minutes. No purposeful movements; no command following. 1150  Patient agitated; eyes open; biting on ETT. Does not follow commands. Medicated with morphine 4 mg IV. Continues on propofol @ 50 mcg/kg/min. 1200  Now resting quietly; reassessment completed. 1300  Continues to rest quietly; visitors at bedside. 1400  Urine output now 5 ml/hr. Bladder scan reveals  ml. Will monitor. 1445  Bedside and verbal report to Jessica Tavera RN.

## 2017-05-10 NOTE — PROCEDURES
[address]   [place], [address]   EEG       Name:  Yelena Duarte   MR#:  [id number]   :  1970   Account #:  [de-identified]    Date of Procedure:  05/10/2017   Date of Adm:  2017       EXAM NUMBER: [id number]. DESCRIPTION OF PROCEDURE: Electrodes were applied in   accordance with the International 10-20 system electrode placement. EEG was reviewed in both bipolar and referential montages. In   addition to EEG data, EKG data was simultaneously recorded. DESCRIPTION OF FINDINGS: The background consists of low   amplitude delta activity. This activity does not appear to respond to the   technician's attempts of activation. Occasional triphasics are seen. No   overt seizures are noted on the study. IMPRESSION: This EEG is abnormal for generalized slowing   consistent with a global cortical insult consistent with the patient's   history of sudden cardiac arrest. Clinical correlation advised. Absence of seizures on this study does not exclude epilepsy.       Martita Wang MD      [facility]   D:  05/10/2017   11:57   T:  05/10/2017   14:57   Job #:  [id number]
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
## 2017-05-10 NOTE — PROGRESS NOTES
Attended Interdisciplinary rounds in Critical Care Unit, where patient care was discussed. Visit by: Merry Lira. Laz Dutton.  Yuliana Goodman MA, [address] [place] [address]

## 2017-05-10 NOTE — PROGRESS NOTES
Pharmacy Automatic Renal Dosing Protocol - Antimicrobials    Indication for Antimicrobials: Aspiration PNA    Current Regimen of Each Antimicrobial (Start Day & Day of Therapy):  Piperacillin-tazobactam 4.5 gm IV every 8 hours (Started 5/10/17; Day #1)    Significant Cultures:   None ordered    Recent Labs      05/10/17   0430  17   1757   CREA  1.32*  1.47*   BUN  21*  17   WBC  29.5*  30.4*     Temp (24hrs), Av °F (37.2 °C), Min:98.8 °F (37.1 °C), Max:99.4 °F (37.4 °C)    Creatinine Clearance: Greater than 50 mL/min    Impression/Plan:   - Per piperacillin-tazobactam protocol, piperacillin-tazobactam dosed at 4.5 gm IV once administered over 30 minutes followed by piperacillin-tazobactam 4.5 gm IV every 8 hours administered over 240 minutes. The maintenance dose is scheduled to start 6 hours after the initial dose. Pharmacy will follow daily and adjust medications as appropriate for renal function and/or serum levels.     Thank you,  Ema Sherwood, PHARMD
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
## 2017-05-10 NOTE — PROCEDURES
Cardiac Cathetherization Note     PreOp Diagnosis:    []       Chest Pain   [x]       STEMI; Anterior   []       Unstable Angina   []       NSTEMI   []       Cardiomyopathy/Heart Failure   []       Abnormal Stress Test   []       Valve Disease   []       Pre-Operative Evaluation   [x]       Other:  Cardiac Arrest    Findings/PostOp Diagnosis:   1. Single vessel CAD involving the 100% occluded pLAD. 2. Moderately reduced LVEF; 40%   3. Global HK with distal anterior, apical, and distal inferior wall AK. 4. LV apical aneurysm  5. Elevated LVEDP  6. Successful PTCA and PCI of the LAD with 2 GISELE (DTB: 25min)    Recommendations:   1. Continue DAPT uninterrupted for 12 months and ASA 81mg indefinitely thereafter   2. Routine post procedure & access site care   3. Continue aggressive medical management and risk factor modification     I have explained the nature of cardiac catheterization and possible percutaneous coronary intervention including risks and benefits of the procedure with the patient which include at least a 1:1000 risk for diagnostic procedure and 1/100 risk for percutaneous intervention. (This risk was assumed given the emergent nature of the procedure and the patients hemodynamic instability; intubated s/p cardiac arrest)    Risks include but are not limited to risk of heart attack, stroke, vascular trauma requiring surgical repair or transfusion, abnormal heart rhythm requiring defibrillation or pacemaker, need for intraaortic balloon pump support, renal dysfunction requiring dialysis, exacerbated gastrointestinal bleeding, allergic response to medications requiring ventilatory support, emergent cardiac surgery and even death. They also understand the need for medical compliance - particularly if stenting is required - mandating continued daily consumption of aspirin and plavix or other antiplatelet therapy. Differences between medicated stent versus bare metal stent reviewed with patient.      The patient expresses an understanding and verbally consents. They also understand plans for either radial or femoral access - with unique risks to both vascular beds including arterial occlusion, vascular trauma, hematoma and need for vascular surgery. I have answered all of their questions regarding the procedure and they are willing to proceed. Procedures: LHC, Cors, Cineflouroscopy, LVgram     Indication:  As above    Procedure status: []  Elective  [] Urgent  [x] Emergent    Operators:  Marily Muir DO    Assistants:     Access:   [x]  RIGHT Radial  []  LEFT Radial  [] RCFA  []  LCFA  []  RCFV  []  LCFV    Catheters: 6Fr JR4, EBU 3.5, pig    Closure: [x]  TR Band  []  Angioseal  []  Perclose  []  Manual Compression    Tubes/Drains:  [x] No tubes or drains remain from this procedure  [] Other:     Estimated Blood Loss: Minimal     Specimens: None     Sedation: Moderate conscious sedation with IV fentany, propofol, local anesthesia with 1% lidocaine. This was performed by non-anesthesia personnel and I provided direct supervision to a trained independent observer. Time under moderate sedation: 77  Min    Patient age:  48 y.o. Contrast:   254  cc  [x]  Isovue   []  Visipaque    Fluoro Time:   15  Min    Radiation Dose:   9913  mGy    Complications:  [x] None  [] Other:     Patient Condition at the end of the procedure:  [x] Stable  [] Other:      Hemodynamics: Ao:  96/73/79  LV:  96/20    Cors:     Dominance: [x] Right  [] Left  [] Mixed    LM: Large caliber vessel without significant stenosis    RI: Moderate caliber vessel with luminal irregularities    LAD: Moderate caliber vessel with a proximal 100% occlusion. After PCI, is a moderate caliber vessel that wraps around the apex without significant stenosis. D1: Small caliber vessel with mild diffuse disease. D2: After PCI, is a moderate caliber vessel with ostial plaque shift and ELIZABETH 3 flow.     LCX: Moderate caliber vessel with luminal irregularities. The AV groove portion is small with luminal irregularities. OM1: Moderate caliber vessel with mild diffuse disease  OM2: Moderate caliber vessel without significant stenosis. RCA: Large caliber, dominant vessel with mild proximal and mid disease up to 30%. PDA: Moderate caliber vessel with luminal irregularities. PLB: Moderate caliber vessel with mild diffuse disease    LV angiography:   EF: 40%  Wall motion: Global hypokinesis; distal anterior, distal inferior, and apical akinesis. The apex is aneurysmal.  MR: None      Indication for PCI:  Anterior STEMI (acute) with significant occlusion of the LAD. Technique:    Lesion #1:  LAD    Anticoagulation:  Bivalirudin was used for anticoagulation. Guiding Catheter:  A 6Fr EBU 3.5 guiding catheter was used to engage the Left Main. Guidewire:  A Runthrough guidewire was used to cross the lesion without difficulty. Predilatation:  A 2.0 x 12mm Mini Trek balloon was used and inflated at 14 JENI at several locations in the proximal vessel with restoration of flow to the distal vessel. Stenting:  Next, a 3.0 x 33mm Xience Alpine GISELE was used and deployed at 20 JENI for 20 seconds. There was a residual stenosis at the distal stent edge and, thus, a 2.5 x 12mm Xience Alpine GISELE was deployed distally, in an overlapping fashion, at 20 JENI for 20 seconds. The overlap was dilated with the stent balloon at 15 JENI for 15 seconds. Post-PCI Angiogram:  Angiogram following PCI showed good results with ELIZABETH 3 flow without evidence of dissection, residual stenosis, or perforation. There is slight plaque shift into the second diagonal with ELIZABETH 3 flow. The patient tolerated the procedure well without any significant hemodynamic instability. Results:  1. Successful PTCA and PCI of the LAD with 2 GISELE. Recommendations:  1. Continue bivalirudin for an additional 2 hours. Integrillin started w/ double bolus.   Once gastric access is obtained; give 180mg ticagrelor and turn bivalirudin off. Continue integrillin for 4 hours. Rectal ASA. 2.  Ticagrelor 90mg twice daily for at least the next 12 months. 3.  ASA 81mg daily for life.     [facility], DO      Myranda Settler III, DO

## 2017-05-10 NOTE — PROGRESS NOTES
Spiritual Care Assessment/Progress Notes    Oswaldo Rashid [id number]  [id number]    1970  48 y.o.  male    Patient Telephone Number: There is no home phone number on file. Mu-ism Affiliation:    Language:    No emergency contact information on file. Patient Active Problem List    Diagnosis Date Noted    STEMI (ST elevation myocardial infarction) ([facility] [address] 05/09/2017        Date: 5/10/2017       Level of Mu-ism/Spiritual Activity:  []         Involved in julia tradition/spiritual practice    []         Not involved in julia tradition/spiritual practice  [x]         Spiritually oriented    []         Claims no spiritual orientation    []         seeking spiritual identity  []         Feels alienated from Gnosticism practice/tradition  []         Feels angry about Gnosticism practice/tradition  [x]         Spirituality/Gnosticism tradition is a resource for coping at this time.   [x]         Not able to assess due to medical condition    Services Provided Today:  []         crisis intervention    []         reading Scriptures  [x]         spiritual assessment    [x]         prayer  [x]         empathic listening/emotional support  []         rites and rituals (cite in comments)  []         life review     []         Gnosticism support  []         theological development    []         advocacy  []         ethical dialog     []         blessing  []         bereavement support    [x]         support to family  []         anticipatory grief support   []         help with AMD  []         spiritual guidance    []         meditation      Spiritual Care Needs  [x]         Emotional Support  []         Spiritual/Mu-ism Care  []         Loss/Adjustment  []         Advocacy/Referral                /Ethics  []         No needs expressed at               this time  []         Other: (note in               comments)  [address]  []         Follow up visits with               pt/family  []         Provide materials  []         Schedule sacraments  []         Contact Community               Clergy  [x]         Follow up as needed  []         Other: (note in               comments)     Comments:  Visited with patient's partner, Milli Blakely. Patient is intubated. Provided listening presence and emotional support as Kalyani Hodge shared how difficult it was to hear of patient's crisis and have to get in the car and drive 6 hours to be here. He expressed his immediate concerns about uncertainty of patient's prognosis. He acknowledged a basic belief in God and was receptive to assurance of prayer. Chaplains will follow to provide ongoing pastoral support.   MARILU Forde, [facility], [address] [facility] [address]     Paging Service  [phone])

## 2017-05-10 NOTE — WOUND CARE
Pressure Ulcer Prevention In basket Alert Received for Frank < 14 (moderate risk).      Suggested Care Plan/Interventions for Nursing  1. Complete Frank Pressure Ulcer Risk Scale and use sub scores to identify appropriate interventions. 2. Perform Assessment: skin, changes in LOC, visual cues for pain, monitor skin under medical devices  3. Respond to Reduced Sensory Perception: changes in LOC, check visual cues for pain, float heels, suspension boots, pressure redistribution bed/mattress/chair cushion, turning and reposition approximately every 2 hours (pillows & wedges), pad between skin to skin, turn & reposition  4. Manage Moisture: absorbent under pads, internal / external urinary device, internal /  external fecal device, minimize layers, contain wound drainage, access need for specialty bed, limit adult briefs, maintain skin hydration (lotion/cream), moisture barrier, offer toileting every hour  5. Promote Activity: increase time out of bed, chair cushion, PT/OT evaluation, trapeze to reposition, pressure redistribution bed/mattress/chair  6. Address Reduced Mobility: float heels / suspension boot, HOB 30 degrees or less, pressure redistribution bed/mattress/cushion, PT / OT evaluation, turn and reposition approximately every 2 hours (pillows & wedges)  7. Promote Nutrition: document food / fluid / supplement intake, encourage/assist with meals as needed  8. Reduce Friction and Shear: transferring/repositioning devices (lift/draw sheet), lift team/ patient mobility team, feet elevated on foot rest, minimize layers, foam dressing / transparent film / skin sealants, protective barrier creams and emollients, transfer aides (board, Kulwant lift, ceiling lift, stand assist), HOB 30 degrees or less, trapeze to reposition.   Wound Care Team

## 2017-05-10 NOTE — PROGRESS NOTES
Follow up visit in CCU. Patient remains intubated. Patient's cousin and her partner were present. Offered listening presence as she shared they, along with Emelinakassandrakayla Galen, and other family members are very much into the spiritual aspect of any healing patient may need at this time. They seem realistic about gravity of patient's condition. Other friends/family will be coming from [place] over the next several days. Offered assurance of continued prayer and reminded her of  availability for ongoing support.   Gerald Councilman, MPS, [facility], [address] [place] [address] [address]     Paging Service  [phone])

## 2017-05-10 NOTE — PROGRESS NOTES
Bedside and Verbal shift change report given to Soraya Pereira RN (oncoming nurse) by Rose Goyal RN (offgoing nurse). Report included the following information SBAR, Kardex, Procedure Summary, Intake/Output, MAR, Recent Results, Med Rec Status and Cardiac Rhythm NSR.     1955 In to assess patient, friends at bedside, suctioned patient orally and via ETT, per ET suctioned of dark reddish brown drainage, orally suctioned of clear sputum. Patient oxygen sats 90-93% on vent with FIO2 of 70%. Suctioned again.      2015 Patient is febrile with temp 101.4 axillary sats now 89%, contacted RT and made aware, attempted to use forehead probe
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
## 2017-05-10 NOTE — INTERDISCIPLINARY ROUNDS
Interdisciplinary team rounds were held 5/9/17 with the following team members:Care Management, Diabetes Treatment Specialist, Nursing, Nutrition, Pastoral Care, Pharmacy, Physician, Respiratory Therapy and Clinical Coordinator. Plan of care discussed. Goal: See MD orders and progress notes for further  interventions and desired outcomes.

## 2017-05-11 NOTE — PROGRESS NOTES
Attended Interdisciplinary rounds in Critical Care Unit, where patient care was discussed. Visit by: Shaniqua Eli. Martina Brain.  Bernadine Myers MA, [address]

## 2017-05-11 NOTE — PROGRESS NOTES
Nutrition Assessment:    RECOMMENDATIONS:   Initiate TF via OGT:   Start Jevity 1.5 @ 10mL/h, advance rate 10mL q 8h as tolerated to Goal Rate of 50mL/h + 1 packet Prosource daily + 100mL H2) flush q 4h (provides 1860kcals/92gPro/1512mL)     DIETITIANS INTERVENTIONS/PLAN:   Initiate TF    ASSESSMENT:   Pt admitted with STEMI. PMH: HTN, GERD. Chart reviewed, case discussed during CCU rounds. Pt is s/p cardiac arrest, workup shows significant neuro deficits. He is intubated and sedated on propofol @ 22. 4mL/h which provides 591 kcals daily. OGT to suction, 550mL OP yesterday. Labs reviewed. K+ 3.1, being repleted. Discussed with Dr. Brittny Thomson who agrees to start TF today, see orders above. Will monitor tolerance and plan of care. SUBJECTIVE/OBJECTIVE:   Pt intubated and sedated   Diet Order: NPO  % Eaten:  No data found. Pertinent Medications:protonix, zosyn; Drips: propofol. Chemistries:  Lab Results   Component Value Date/Time    Sodium 136 05/11/2017 04:19 AM    Potassium 3.1 05/11/2017 04:19 AM    Chloride 102 05/11/2017 04:19 AM    CO2 24 05/11/2017 04:19 AM    Anion gap 10 05/11/2017 04:19 AM    Glucose 129 05/11/2017 04:19 AM    BUN 15 05/11/2017 04:19 AM    Creatinine 1.07 05/11/2017 04:19 AM    BUN/Creatinine ratio 14 05/11/2017 04:19 AM    GFR est AA >60 05/11/2017 04:19 AM    GFR est non-AA >60 05/11/2017 04:19 AM    Calcium 8.1 05/11/2017 04:19 AM    AST (SGOT) 97 05/11/2017 04:19 AM    Alk. phosphatase 73 05/11/2017 04:19 AM    Protein, total 6.6 05/11/2017 04:19 AM    Albumin 3.1 05/11/2017 04:19 AM    Globulin 3.5 05/11/2017 04:19 AM    A-G Ratio 0.9 05/11/2017 04:19 AM    ALT (SGPT) 77 05/11/2017 04:19 AM      Anthropometrics: Height: 5' 10\" (177.8 cm) Weight: 124.9 kg (275 lb 5.7 oz)    IBW (%IBW): 75.5 kg (166 lb 7.2 oz) ( ) UBW (%UBW):   (  %)    BMI: Body mass index is 39.51 kg/(m^2).     This BMI is indicative of:  []Underweight   []Normal   []Overweight   [x] Obesity   [] Extreme Obesity (BMI>40)  Estimated Nutrition Needs (Based on): 2533 Kcals/day (PSU (MSJ 2123)) , 88 g (1gPro/kg ABW) Protein  Carbohydrate: At Least 130 g/day  Fluids: 2000 mL/day or per MD     Last BM: PTA   []Active     []Hyperactive  [x]Hypoactive       [] Absent   BS  Skin:    [] Intact   [x] Incision  [] Breakdown   [] DTI   [] Tears/Excoriation/Abrasion  []Edema [] Other: Wt Readings from Last 30 Encounters:   05/11/17 124.9 kg (275 lb 5.7 oz)      NUTRITION DIAGNOSES:   Problem:  Inadequate protein-energy intake      Etiology: related to pt intubated and NPO      Signs/Symptoms: as evidenced by NPO + propofol meets <25% kcal and 0% protein needs. NUTRITION INTERVENTIONS:    Enteral/Parenteral Nutrition: Initiate enteral nutrition                GOAL:   Pt will tolerate TF initiation with residuals <250mL in 2-4 days.      Cultural, Jain, or Ethnic Dietary Needs: None     LEARNING NEEDS (Diet, Food/Nutrient-Drug Interaction):    [x] None Identified   [] Identified and Education Provided/Documented   [] Identified and Pt declined/was not appropriate      [x] Interdisciplinary Care Plan Reviewed/Documented    [x] Participated in Discharge Planning: Unable to determine     [x] Interdisciplinary Rounds     NUTRITION RISK:    [x] High              [] Moderate           []  Low  []  Minimal/Uncompromised    PT SEEN FOR:    [x]  MD Consult: []Calorie Count      []Diabetic Diet Education        []Diet Education     []Electrolyte Management     []General Nutrition Management and Supplements     [x]Management of Tube Feeding     []TPN Recommendations    []  RN Referral:  []MST score >=2     []Enteral/Parenteral Nutrition PTA     []Pregnant: Gestational DM or Multigestation   []  Low BMI  []  DTR Referral       Carmen Marie, [address]  Pager [phone]  Weekend Pager [phone]

## 2017-05-11 NOTE — PROGRESS NOTES
5/11/2017    INTENSIVIST PROGRESS NOTE:     Patient seen and evaluated   47 yo male out of hospital arrest  Transferred to ED [facility], Dx'd with a STEMI, taken to cath lab for intervention  Admitted to ccu post cath unresponsive, intubated  Developed seizure activity overnight       Visit Vitals    /86    Pulse (!) 106    Temp 99.2 °F (37.3 °C)    Resp 27    Ht 5' 10\" (1.778 m)    Wt 124.9 kg (275 lb 5.7 oz)    SpO2 94%    BMI 39.51 kg/m2       General: comatose, unresponsive  CV: RRR  Lungs: clear    CXR: RLL ASDZ improved, LLL atx    LABS reviewed    A/P:  Vent support  No need for pressors for now  Post AMI care per cardiology  PUD/DVT prophylaxis  IV zosyn for aspiration pneumonia  Neuro eval ongoing, EEG revealed expected slowing  Keppra for seizure activity  Sedation as needed  Replete K again today  Will assist on disposition planning if pt recovers  Alejandro Sandhu MD

## 2017-05-11 NOTE — PROGRESS NOTES
Chief Complaint: seizure    No seizures reported. EEG reviewed Imaging reviewed. Multiple level spondylosis and stenosis. ASSESSMENT AND PLAN   1. Seizure  Continue keppra     2. Cardiac arrest  S/p stenting     3. Hyperlipidemia     4.Cervical spondylosis  MRI of the C spine when feasible  No longer myelopathic on exam    Patient is in critical condition and is at risk for sudden deterioration. Time: 30 minutes   Allergies  Review of patient's allergies indicates no known allergies. Medications  Current Facility-Administered Medications   Medication Dose Route Frequency    levETIRAcetam (KEPPRA) 500 mg in 0.9% sodium chloride IVPB  500 mg IntraVENous Q12H    piperacillin-tazobactam (ZOSYN) 4.5 g in 0.9% sodium chloride (MBP/ADV) 100 mL  4.5 g IntraVENous Q8H    LORazepam (ATIVAN) injection 2-5 mg  2-5 mg IntraVENous Q1H PRN    pantoprazole (PROTONIX) 40 mg in sodium chloride 0.9 % 10 mL injection  40 mg IntraVENous Q12H    sodium chloride (NS) flush 5-10 mL  5-10 mL IntraVENous Q8H    sodium chloride (NS) flush 5-10 mL  5-10 mL IntraVENous PRN    morphine injection 4 mg  4 mg IntraVENous Q4H PRN    ondansetron (ZOFRAN) injection 4 mg  4 mg IntraVENous Q4H PRN    ticagrelor (BRILINTA) tablet 90 mg  90 mg Oral Q12H    aspirin delayed-release tablet 81 mg  81 mg Oral DAILY    fentaNYL (PF) 900 mcg/30 ml infusion soln   mcg/hr IntraVENous TITRATE    sodium chloride (NS) flush 5-10 mL  5-10 mL IntraVENous Q8H    sodium chloride (NS) flush 5-10 mL  5-10 mL IntraVENous PRN    mupirocin (BACTROBAN) 2 % ointment   Both Nostrils BID    chlorhexidine (PERIDEX) 0.12 % mouthwash 15 mL  15 mL Oral Q12H    propofol (DIPRIVAN) infusion  5-50 mcg/kg/min IntraVENous TITRATE        Medical History  No past medical history on file.           Exam:    Visit Vitals    /80    Pulse 95    Temp (!) 101.2 °F (38.4 °C)    Resp 18    Ht 5' 10\" (1.778 m)    Wt 275 lb (124.7 kg)    SpO2 96%    BMI 39.46 kg/m2     GEN: Intubated and sedated  HEENT: NC AT Anicteric sclera  CHEST: rhoncorous (Lt) No wheezes  HEART: S1 and S2 regular rate and rhythm  ABD: Soft nontender non distended  EXT: No edema     Neuro  Comatose  No clear signs of purposeful movement however appears to respond to husbands voice. Cranial Nerves : intact pupils, corneal reflex, dolls eyes, No response to sound and no facial grimace. Withdraws extremities x4 to nailbed pressure  Reflexes: 3+  both lower extremities    Imaging    CT Results (most recent):    Results from Hospital Encounter encounter on 05/09/17   CT SPINE CERV WO CONT   Narrative INDICATION:   Recent trauma, spine with neck injury    COMPARISON: None. TECHNIQUE:   Noncontrast axial CT imaging of the cervical spine was performed. Coronal and sagittal reconstructions were obtained. CT dose reduction was achieved through the use of a standardized protocol  tailored for this examination and automatic exposure control for dose  modulation. FINDINGS:    There is no evidence of acute osseous abnormality. There is no acute alignment  abnormality. Vertebral body heights are maintained. There is no appreciable  prevertebral soft tissue swelling or epidural hematoma. There is multilevel  degenerative spondylosis. There is multilevel osseous neuroforaminal stenosis. There is central canal stenosis in the lower cervical spine. Evaluation of the  paraspinal soft tissues demonstrates no significant pathology. The visualized  lung apices are clear. The patient is intubated. There is an NG tube. Impression IMPRESSION:    1. No acute osseous abnormality. 2. Multilevel degenerative spondylosis. MRI Results (most recent):  No results found for this or any previous visit.     .  Lab Review    Lab Results   Component Value Date/Time    WBC 29.5 05/10/2017 04:30 AM    HCT 45.0 05/10/2017 04:30 AM    HGB 15.3 05/10/2017 04:30 AM    PLATELET 622 92/37/8344 04:30 AM Lab Results   Component Value Date/Time    Sodium 136 05/10/2017 04:30 AM    Potassium 3.3 05/10/2017 04:30 AM    Chloride 100 05/10/2017 04:30 AM    CO2 27 05/10/2017 04:30 AM    Glucose 109 05/10/2017 04:30 AM    BUN 21 05/10/2017 04:30 AM    Creatinine 1.32 05/10/2017 04:30 AM    Calcium 7.9 05/10/2017 04:30 AM       Lab Results   Component Value Date/Time    Hemoglobin A1c 5.5 05/10/2017 04:30 AM        Lab Results   Component Value Date/Time    Cholesterol, total 168 05/10/2017 04:30 AM    HDL Cholesterol 39 05/10/2017 04:30 AM    LDL, calculated 101 05/10/2017 04:30 AM    VLDL, calculated 28 05/10/2017 04:30 AM    Triglyceride 140 05/10/2017 04:30 AM    CHOL/HDL Ratio 4.3 05/10/2017 04:30 AM

## 2017-05-11 NOTE — PROGRESS NOTES
Gastroenterology Daily Progress Note (Dr. Fifi Acevedo)    Admit Date: 5/9/2017       Subjective:       Patient seen with family members at bedside. He has blood in aspirate from ETT and some dark material in the OG aspirate as well BUT stools brown. Current Facility-Administered Medications   Medication Dose Route Frequency    levETIRAcetam (KEPPRA) 500 mg in 0.9% sodium chloride IVPB  500 mg IntraVENous Q12H    piperacillin-tazobactam (ZOSYN) 4.5 g in 0.9% sodium chloride (MBP/ADV) 100 mL  4.5 g IntraVENous Q8H    LORazepam (ATIVAN) injection 2-5 mg  2-5 mg IntraVENous Q1H PRN    pantoprazole (PROTONIX) 40 mg in sodium chloride 0.9 % 10 mL injection  40 mg IntraVENous Q12H    sodium chloride (NS) flush 5-10 mL  5-10 mL IntraVENous Q8H    sodium chloride (NS) flush 5-10 mL  5-10 mL IntraVENous PRN    morphine injection 4 mg  4 mg IntraVENous Q4H PRN    ondansetron (ZOFRAN) injection 4 mg  4 mg IntraVENous Q4H PRN    ticagrelor (BRILINTA) tablet 90 mg  90 mg Oral Q12H    aspirin delayed-release tablet 81 mg  81 mg Oral DAILY    fentaNYL (PF) 900 mcg/30 ml infusion soln   mcg/hr IntraVENous TITRATE    sodium chloride (NS) flush 5-10 mL  5-10 mL IntraVENous Q8H    sodium chloride (NS) flush 5-10 mL  5-10 mL IntraVENous PRN    mupirocin (BACTROBAN) 2 % ointment   Both Nostrils BID    chlorhexidine (PERIDEX) 0.12 % mouthwash 15 mL  15 mL Oral Q12H    propofol (DIPRIVAN) infusion  5-50 mcg/kg/min IntraVENous TITRATE        Objective:     Visit Vitals    /86    Pulse (!) 106    Temp 99.2 °F (37.3 °C)    Resp 27    Ht 5' 10\" (1.778 m)    Wt 124.9 kg (275 lb 5.7 oz)    SpO2 94%    BMI 39.51 kg/m2   Blood pressure 154/86, pulse (!) 106, temperature 99.2 °F (37.3 °C), resp. rate 27, height 5' 10\" (1.778 m), weight 124.9 kg (275 lb 5.7 oz), SpO2 94 %.          05/09 1901 - 05/11 0700  In: 1826.8 [I.V.:1756.8]  Out: 2435 [Urine:2035]      Intake/Output Summary (Last 24 hours) at 05/11/17 0752  Last data filed at 05/10/17 1800   Gross per 24 hour   Intake           1088.8 ml   Output             1335 ml   Net           -246.2 ml     Physical Exam:     General: intubated WM lying in bed in nad  Chest:  Coarse breath sounds at bases b/l  Heart: S1, S2, RRR  GI: Soft, NT, ND + bowel sounds  Extremities: No edema or cyanosis    Labs:       Recent Results (from the past 24 hour(s))   CBC WITH AUTOMATED DIFF    Collection Time: 05/11/17  4:19 AM   Result Value Ref Range    WBC 26.6 (H) 4.1 - 11.1 K/uL    RBC 4.39 4. 10 - 5.70 M/uL    HGB 13.5 12.1 - 17.0 g/dL    HCT 39.5 36.6 - 50.3 %    MCV 90.0 80.0 - 99.0 FL    MCH 30.8 26.0 - 34.0 PG    MCHC 34.2 30.0 - 36.5 g/dL    RDW 13.3 11.5 - 14.5 %    PLATELET 289 358 - 492 K/uL    NEUTROPHILS 84 %    LYMPHOCYTES 10 %    MONOCYTES 6 %    EOSINOPHILS 0 %    BASOPHILS 0 %    ABS. NEUTROPHILS 22.3 K/UL    ABS. LYMPHOCYTES 2.7 K/UL    ABS. MONOCYTES 1.6 K/UL    ABS. EOSINOPHILS 0.0 K/UL    ABS. BASOPHILS 0.0 K/UL    RBC COMMENTS NORMOCYTIC, NORMOCHROMIC      DF MANUAL     METABOLIC PANEL, COMPREHENSIVE    Collection Time: 05/11/17  4:19 AM   Result Value Ref Range    Sodium 136 136 - 145 mmol/L    Potassium 3.1 (L) 3.5 - 5.1 mmol/L    Chloride 102 97 - 108 mmol/L    CO2 24 21 - 32 mmol/L    Anion gap 10 5 - 15 mmol/L    Glucose 129 (H) 65 - 100 mg/dL    BUN 15 6 - 20 MG/DL    Creatinine 1.07 0.70 - 1.30 MG/DL    BUN/Creatinine ratio 14 12 - 20      GFR est AA >60 >60 ml/min/1.73m2    GFR est non-AA >60 >60 ml/min/1.73m2    Calcium 8.1 (L) 8.5 - 10.1 MG/DL    Bilirubin, total 0.6 0.2 - 1.0 MG/DL    ALT (SGPT) 77 12 - 78 U/L    AST (SGOT) 97 (H) 15 - 37 U/L    Alk.  phosphatase 73 45 - 117 U/L    Protein, total 6.6 6.4 - 8.2 g/dL    Albumin 3.1 (L) 3.5 - 5.0 g/dL    Globulin 3.5 2.0 - 4.0 g/dL    A-G Ratio 0.9 (L) 1.1 - 2.2     MAGNESIUM    Collection Time: 05/11/17  4:19 AM   Result Value Ref Range    Magnesium 2.2 1.6 - 2.4 mg/dL   PROTHROMBIN TIME + INR Collection Time: 05/11/17  4:19 AM   Result Value Ref Range    INR 1.0 0.9 - 1.1      Prothrombin time 10.1 9.0 - 11.1 sec   BLOOD GAS, ARTERIAL    Collection Time: 05/11/17  6:00 AM   Result Value Ref Range    pH 7.45 7.35 - 7.45      PCO2 40 35.0 - 45.0 mmHg    PO2 52 (L) 80 - 100 mmHg    O2 SAT 89 (L) 92 - 97 %    BICARBONATE 27 (H) 22 - 26 mmol/L    BASE EXCESS 2.5 mmol/L    O2 METHOD VENTILATOR      FIO2 90 %    MODE A/C      Tidal volume 600      SET RATE 12      EPAP/CPAP/PEEP 5.0      Sample source ARTERIAL      SITE RIGHT RADIAL      ALBER'S TEST YES       Recent Labs      05/11/17   0419  05/10/17   0430  05/09/17   1757   NA  136  136  136   K  3.1*  3.3*  3.6   CL  102  100  102   CO2  24  27  23   BUN  15  21*  17   CREA  1.07  1.32*  1.47*   GLU  129*  109*  127*   CA  8.1*  7.9*  8.1*   MG  2.2  2.2  2.4     Impression:    STEMI s/p Stent on Brillinta  Blood in OG tube and also ETT         Plan:  Patient with stable H/H and some bleeding in the ETT and no blood in stool reported so does not seem to be a hemodynamically significant GIB at this time but could be esophagogastritis and he is on high dose PPI. Would not pursue EGD at this time given risks and family agrees with plan.        Vasyl Garcia MD    5/11/2017  [address] [place] [address], [address] [place] [address].. [phone]  [address] [place] [address] [place]: [phone]

## 2017-05-11 NOTE — PROGRESS NOTES
05/11/17 0613   ABCDE Bundle   SBT Safety Screen Passed No   SBT Screen Reason for Failure FiO2 > 50%

## 2017-05-11 NOTE — CARDIO/PULMONARY
C/P Rehab  Chart Reviewed. Note:Anterior STEMI s/p 2 GISELE to the proximal LAD, LV EF 40%. Cardiac arrest s/p ROSC (VT/VF) with anoxic brain injury. PMH significant for:  -Tobacco Abuse  -HTN      Pt intubated. Will continue to follow.

## 2017-05-11 NOTE — PROGRESS NOTES
Visited with patient's partner, Luis Florentinoerd and Jimenez's brother Krunal Mosquera. Provided listening presence and emotional support as Luis Yangpherd offered update on Archie's current condition. They are [place] so arranged for Catana Ree to Marcelino Gutierrez and was present for anointing. Archie's parents and one brother are both . He does have some cousins in [place]. Abhishek Monson and Luis Florentinoerd have been together 25 years and have been legally wed for two. Offered assurance of continued prayer and availability of chaplains for ongoing support.   MARILU Byrd, [facility], [address] [facility] [address]     Paging Service  [phone]

## 2017-05-11 NOTE — PROGRESS NOTES
Progress Note      5/11/2017 7:33 AM  NAME: Giles Mccormick   MRN:  [id number]   Admit Diagnosis: STEMI (ST elevation myocardial infarction) ([facility] [address]      Problem List:     1. Anterior STEMI s/p 2 GISELE to the proximal LAD  2. Cardiac arrest s/p ROSC (VT/VF)  3. Aspiration w/ leukocytosis  4. Anoxic brain injury  5. Seizure-like activity  6. Acute kidney insufficiency -- resolved  7. Coagulopathy -- resolved; INR 2.5 -- down to 1.0  8. Shock liver -- resolved   9. Hypertension  10. Anxiety  11. GERD  12. Tobacco use; active  13. ;  is Samra Duckworth ([phone])  15. Very strong FMHx of early CAD (father @ 52, mother @ 64, brother @ 37 dead from MIs)  13. Lives in [place]  16. FULL CODE     Assessment/Plan:   HDCT/C-spine ok  On ABX for aspiration event  Coffee-grounds from OGT; Hgb stable  Hemodynamically stable  Hopeful for neuro recovery  Off propofol -- withdraws to pain  EF 40% by echo as well  Discussed w/ ; waiting game    1. Continue ASA   2. Continue ticagrelor  3. Start metoprolol 25mg Q12h  4. Hold atorvastatin given elevated LFTs  5. Hold ACEi/ARB for now (DIANE)  6. Antibiotics per pulmonary  7. Pulmonary/CC following  8. Neurology consult appreciated; antiepileptics per neurology  9. Gastroenterology consult appreciated  10. Given revascularization; not a candidate for ICD or LIFEVEST should he recover unless has other events during hospital stay         [x]       High complexity decision making was performed in this patient at high risk for decompensation with multiple organ involvement. Subjective:     Giles Mccormick is intubated and sedated  Discussed with RN events overnight.      Review of Systems:    Symptom Y/N Comments  Symptom Y/N Comments   Fever/Chills N   Chest Pain N    Poor Appetite N   Edema N    Cough N   Abdominal Pain N    Sputum N   Joint Pain N    SOB/DAMON N   Pruritis/Rash N    Nausea/vomit N   Tolerating PT/OT Y    Diarrhea N   Tolerating Diet Y Constipation N   Other       Could NOT obtain due to:      Objective:      Physical Exam:    Last 24hrs VS reviewed since prior progress note. Most recent are:    Visit Vitals    /83 (BP 1 Location: Right arm, BP Patient Position: At rest)    Pulse (!) 107    Temp (!) 100.5 °F (38.1 °C)    Resp 24    Ht 5' 10\" (1.778 m)    Wt 124.9 kg (275 lb 5.7 oz)    SpO2 91%    BMI 39.51 kg/m2       Intake/Output Summary (Last 24 hours) at 05/11/17 0824  Last data filed at 05/11/17 0800   Gross per 24 hour   Intake             1014 ml   Output             2025 ml   Net            -1011 ml        General Appearance: Well developed, well nourished, intubated. Obese  Ears/Nose/Mouth/Throat: Pupils equal and round, Hearing grossly normal.  Dried blood around oropharynx  Neck: Supple. JVP within normal limits. Carotids good upstrokes, with no bruit. Chest: Lungs clear to auscultation bilaterally. Cardiovascular: Regular rate and rhythm, S1S2 normal, no murmur, rubs, gallops. Abdomen: Soft, non-tender, bowel sounds are active. No organomegaly. Extremities: Trace edema bilaterally. Femoral pulses +2, Distal Pulses +1. Skin: Warm and dry. Neuro: Sedated    [x]         Post-cath site without hematoma, bruit, tenderness, or thrill. Distal pulses intact.     PMH/SH reviewed - no change compared to H&P    Data Review    Telemetry: sinus rhythm     EKG:   [x]  No new EKG for review    Lab Data Personally Reviewed:    Recent Labs      05/11/17   0419  05/10/17   0430   WBC  26.6*  29.5*   HGB  13.5  15.3   HCT  39.5  45.0   PLT  239  286     Recent Labs      05/11/17 0419 05/09/17   1800   INR  1.0  2.5*   PTP  10.1  26.2*      Recent Labs      05/11/17   0419  05/10/17   0430  05/09/17   1757   NA  136  136  136   K  3.1*  3.3*  3.6   CL  102  100  102   CO2  24  27  23   BUN  15  21*  17   CREA  1.07  1.32*  1.47*   GLU  129*  109*  127*   CA  8.1*  7.9*  8.1*   MG  2.2  2.2  2.4     Recent Labs      05/10/17   0433 05/09/17   1757   CPK  2872*  951*   CKNDX  3.1*  4.9*   TROIQ  41.80*  11.50*     Lab Results   Component Value Date/Time    Cholesterol, total 168 05/10/2017 04:30 AM    HDL Cholesterol 39 05/10/2017 04:30 AM    LDL, calculated 101 05/10/2017 04:30 AM    Triglyceride 140 05/10/2017 04:30 AM    CHOL/HDL Ratio 4.3 05/10/2017 04:30 AM       Recent Labs      05/11/17   0419  05/10/17   0430  05/09/17   1757   SGOT  97*  193*  138*   AP  73  87  105   TP  6.6  7.1  7.1   ALB  3.1*  3.4*  3.4*   GLOB  3.5  3.7  3.7     Recent Labs      05/11/17   0600  05/10/17   0500   PH  7.45  7.44   PCO2  40  35   PO2  52*  113*       Medications Personally Reviewed:    Current Facility-Administered Medications   Medication Dose Route Frequency    albuterol-ipratropium (DUO-NEB) 2.5 MG-0.5 MG/3 ML  3 mL Nebulization QID RT    metoprolol tartrate (LOPRESSOR) tablet 25 mg  25 mg Oral Q12H    potassium chloride 10 mEq in 100 ml IVPB  10 mEq IntraVENous Q1H    piperacillin-tazobactam (ZOSYN) 4.5 g in 0.9% sodium chloride (MBP/ADV) 100 mL MBP  4.5 g IntraVENous Q8H    levETIRAcetam (KEPPRA) 500 mg in 0.9% sodium chloride IVPB  500 mg IntraVENous Q12H    LORazepam (ATIVAN) injection 2-5 mg  2-5 mg IntraVENous Q1H PRN    pantoprazole (PROTONIX) 40 mg in sodium chloride 0.9 % 10 mL injection  40 mg IntraVENous Q12H    sodium chloride (NS) flush 5-10 mL  5-10 mL IntraVENous Q8H    sodium chloride (NS) flush 5-10 mL  5-10 mL IntraVENous PRN    morphine injection 4 mg  4 mg IntraVENous Q4H PRN    ondansetron (ZOFRAN) injection 4 mg  4 mg IntraVENous Q4H PRN    ticagrelor (BRILINTA) tablet 90 mg  90 mg Oral Q12H    aspirin delayed-release tablet 81 mg  81 mg Oral DAILY    fentaNYL (PF) 900 mcg/30 ml infusion soln   mcg/hr IntraVENous TITRATE    sodium chloride (NS) flush 5-10 mL  5-10 mL IntraVENous Q8H    sodium chloride (NS) flush 5-10 mL  5-10 mL IntraVENous PRN    mupirocin (BACTROBAN) 2 % ointment   Both Nostrils BID    chlorhexidine (PERIDEX) 0.12 % mouthwash 15 mL  15 mL Oral Q12H    propofol (DIPRIVAN) infusion  5-50 mcg/kg/min IntraVENous TITRATE         Denver Birch III, DO

## 2017-05-11 NOTE — PROGRESS NOTES
0700-Bedside and Verbal shift change report given to Hector Middleton RN (oncoming nurse) by Chapin Gonzalez RN (offgoing nurse). Report included the following information SBAR, Kardex, ED Summary, Procedure Summary, Intake/Output, MAR, Recent Results and Cardiac Rhythm NSR.     0900-Patient receiving 60mEq IV K+ for K+ of 3.1.    1300-Jevity 1.5 started at 10mL/hr.    1500-UOP stopped. Velazquez irrigated with 50mL sterile water. Urine started draining again with sediment present. 1700-Temp 102.0. PRN Tylenol ordered received from Dr. Kartik Lopez.    1720-Dr. Kartik Lopez at bedside assessing patient. Propofol on standby for his assessment. Patient noted to have sustained clonus with stimulation to the right foot (with the use of a reflux hammer by Dr. Kartik Lopez). Patient opened eyes, no visual threat, positive corneals, no tracking, PERRLA 4's bilaterally, withdrew in all 4 extremeties. Patient once again became tachycardic in the 110's, tachypnic in the 30s, and high tidal volumes in the 700s off sedation. Sedation restarted.

## 2017-05-11 NOTE — PROGRESS NOTES
Pharmacy Automatic Renal Dosing Protocol - Antimicrobials    Indication for Antimicrobials: Aspiration PNA    Current Regimen of Each Antimicrobial (Start Day & Day of Therapy):  Piperacillin-tazobactam 4.5 gm IV every 8 hours (Started 5/10/17; Day #2)    Significant Cultures:   None ordered    Recent Labs      05/10/17   0430  17   1757   CREA  1.32*  1.47*   BUN  21*  17   WBC  29.5*  30.4*     Temp (24hrs), Av °F (37.2 °C), Min:98.8 °F (37.1 °C), Max:99.4 °F (37.4 °C)    Creatinine Clearance: Greater than 50 mL/min    Impression/Plan:   - Per piperacillin-tazobactam protocol, piperacillin-tazobactam dose adjusted to 3.375 gm IV every 8 hours administered over 240 minutes. Pharmacy will follow daily and adjust medications as appropriate for renal function and/or serum levels.     Thank you,  Romeo Garcia, IRVIND

## 2017-05-11 NOTE — PROGRESS NOTES
Chief Complaint: seizure    No seizures reported. EEG reviewed Imaging reviewed. Multiple level spondylosis and stenosis. Discussed condition and prognosis with partner. ASSESSMENT AND PLAN   1. Seizure  Continue keppra     2. Cardiac arrest  S/p stenting     3. Hyperlipidemia     4.Cervical spondylosis  MRI of the C spine when feasible  No longer myelopathic on exam    Patient is in critical condition and is at risk for sudden deterioration. Time: 30 minutes   Allergies  Review of patient's allergies indicates no known allergies.      Medications  Current Facility-Administered Medications   Medication Dose Route Frequency    albuterol-ipratropium (DUO-NEB) 2.5 MG-0.5 MG/3 ML  3 mL Nebulization QID RT    metoprolol tartrate (LOPRESSOR) tablet 25 mg  25 mg Oral Q12H    enoxaparin (LOVENOX) injection 40 mg  40 mg SubCUTAneous Q24H    piperacillin-tazobactam (ZOSYN) 3.375 g in 0.9% sodium chloride (MBP/ADV) 100 mL MBP  3.375 g IntraVENous Q8H    acetaminophen (TYLENOL) solution 650 mg  650 mg Oral Q6H PRN    levETIRAcetam (KEPPRA) 500 mg in 0.9% sodium chloride IVPB  500 mg IntraVENous Q12H    LORazepam (ATIVAN) injection 2-5 mg  2-5 mg IntraVENous Q1H PRN    pantoprazole (PROTONIX) 40 mg in sodium chloride 0.9 % 10 mL injection  40 mg IntraVENous Q12H    sodium chloride (NS) flush 5-10 mL  5-10 mL IntraVENous Q8H    sodium chloride (NS) flush 5-10 mL  5-10 mL IntraVENous PRN    morphine injection 4 mg  4 mg IntraVENous Q4H PRN    ondansetron (ZOFRAN) injection 4 mg  4 mg IntraVENous Q4H PRN    ticagrelor (BRILINTA) tablet 90 mg  90 mg Oral Q12H    aspirin delayed-release tablet 81 mg  81 mg Oral DAILY    fentaNYL (PF) 900 mcg/30 ml infusion soln   mcg/hr IntraVENous TITRATE    sodium chloride (NS) flush 5-10 mL  5-10 mL IntraVENous Q8H    sodium chloride (NS) flush 5-10 mL  5-10 mL IntraVENous PRN    mupirocin (BACTROBAN) 2 % ointment   Both Nostrils BID    chlorhexidine (PERIDEX) 0.12 % mouthwash 15 mL  15 mL Oral Q12H    propofol (DIPRIVAN) infusion  5-50 mcg/kg/min IntraVENous TITRATE        Medical History  Hyperlipidemia  hypertension        Exam:    Visit Vitals    /77    Pulse 97    Temp (!) 100.5 °F (38.1 °C)    Resp 24    Ht 5' 10\" (1.778 m)    Wt 275 lb 5.7 oz (124.9 kg)    SpO2 97%    BMI 39.51 kg/m2     GEN: Intubated and sedated  HEENT: NC AT Anicteric sclera  CHEST: rhoncorous (Lt) No wheezes  HEART: S1 and S2 regular rate and rhythm  ABD: Soft nontender non distended  EXT: No edema     Neuro   Examined off sedation  Comatose GSC 6  Spontaneous eye movement  No clear signs of purposeful movement however appears to respond tvoice. Cranial Nerves : intact pupils, corneal reflex, no dolls eyes, No response to sound and no facial grimace. Withdraws extremities x4 to nailbed pressure  Reflexes: 3+  both lower extremities. Sustained achilles clonus again on exam.     Imaging    CT Results (most recent):    Results from Hospital Encounter encounter on 05/09/17   CT SPINE CERV WO CONT   Narrative INDICATION:   Recent trauma, spine with neck injury    COMPARISON: None. TECHNIQUE:   Noncontrast axial CT imaging of the cervical spine was performed. Coronal and sagittal reconstructions were obtained. CT dose reduction was achieved through the use of a standardized protocol  tailored for this examination and automatic exposure control for dose  modulation. FINDINGS:    There is no evidence of acute osseous abnormality. There is no acute alignment  abnormality. Vertebral body heights are maintained. There is no appreciable  prevertebral soft tissue swelling or epidural hematoma. There is multilevel  degenerative spondylosis. There is multilevel osseous neuroforaminal stenosis. There is central canal stenosis in the lower cervical spine. Evaluation of the  paraspinal soft tissues demonstrates no significant pathology. The visualized  lung apices are clear.  The patient is intubated. There is an NG tube. Impression IMPRESSION:    1. No acute osseous abnormality. 2. Multilevel degenerative spondylosis.            .  Lab Review    Lab Results   Component Value Date/Time    WBC 26.6 05/11/2017 04:19 AM    HCT 39.5 05/11/2017 04:19 AM    HGB 13.5 05/11/2017 04:19 AM    PLATELET 784 02/87/0232 04:19 AM       Lab Results   Component Value Date/Time    Sodium 136 05/11/2017 04:19 AM    Potassium 3.1 05/11/2017 04:19 AM    Chloride 102 05/11/2017 04:19 AM    CO2 24 05/11/2017 04:19 AM    Glucose 129 05/11/2017 04:19 AM    BUN 15 05/11/2017 04:19 AM    Creatinine 1.07 05/11/2017 04:19 AM    Calcium 8.1 05/11/2017 04:19 AM       Lab Results   Component Value Date/Time    Hemoglobin A1c 5.5 05/10/2017 04:30 AM        Lab Results   Component Value Date/Time    Cholesterol, total 168 05/10/2017 04:30 AM    HDL Cholesterol 39 05/10/2017 04:30 AM    LDL, calculated 101 05/10/2017 04:30 AM    VLDL, calculated 28 05/10/2017 04:30 AM    Triglyceride 140 05/10/2017 04:30 AM    CHOL/HDL Ratio 4.3 05/10/2017 04:30 AM

## 2017-05-12 NOTE — PROGRESS NOTES
0730-Bedside and Verbal shift change report given to Pretty Fernández RN (oncoming nurse) by Veda Ansari RN (offgoing nurse). Report included the following information SBAR, Kardex, ED Summary, Procedure Summary, Intake/Output, MAR, Recent Results and Cardiac Rhythm NSR.     0745-Per Dr. Jm Guzman, if patient clear neurologically, may need ENT consult for nasal pharangeal bleeding. Copious clots and blood routinely being suctioned. 0800-Cooling blanket under patient for a temp of 104 rectally. Per Dr. Aaron Marino, see how patient does through the weekend, control fevers, pneumonia is getting better. 0830-IV Tylenol order per Dr. Aaron Marino for temperature regulation. 0935-Eileen Aleman at bedside. 1050-Patient became progressively tachypnic in the 40's with high TVs and was lobored breathing. Received 2mg Ativan without improvement. Per Dr. Aaron Marino 3 more mg Ativan and 4mg Morphine given. Propofol maxed out at 50mcgs. Patient switched from [facility] to [facility] ventilation. Patient's family aware of the changes. 1100-Bedside and Verbal shift change report given to NISH Mcmillan (oncoming nurse) by Pretty Fernández RN (offgoing nurse). Report included the following information SBAR, Kardex, ED Summary, Procedure Summary, Intake/Output, MAR, Recent Results and Cardiac Rhythm NSR.

## 2017-05-12 NOTE — PROGRESS NOTES
05/12/17 0611   ABCDE Bundle   SBT Safety Screen Passed No   SBT Screen Reason for Failure Vasopressor use

## 2017-05-12 NOTE — PROGRESS NOTES
Gastroenterology Daily Progress Note (Dr. Fifi Acevedo)    Admit Date: 5/9/2017       Subjective:       Spoke with RN and family at bedside. R nare packed due to nosebleeding and suctioning blood from pharynx. No melena.     Current Facility-Administered Medications   Medication Dose Route Frequency    albuterol-ipratropium (DUO-NEB) 2.5 MG-0.5 MG/3 ML  3 mL Nebulization QID RT    metoprolol tartrate (LOPRESSOR) tablet 25 mg  25 mg Oral Q12H    enoxaparin (LOVENOX) injection 40 mg  40 mg SubCUTAneous Q24H    piperacillin-tazobactam (ZOSYN) 3.375 g in 0.9% sodium chloride (MBP/ADV) 100 mL MBP  3.375 g IntraVENous Q8H    acetaminophen (TYLENOL) solution 650 mg  650 mg Oral Q6H PRN    levETIRAcetam (KEPPRA) 500 mg in 0.9% sodium chloride IVPB  500 mg IntraVENous Q12H    LORazepam (ATIVAN) injection 2-5 mg  2-5 mg IntraVENous Q1H PRN    pantoprazole (PROTONIX) 40 mg in sodium chloride 0.9 % 10 mL injection  40 mg IntraVENous Q12H    sodium chloride (NS) flush 5-10 mL  5-10 mL IntraVENous Q8H    sodium chloride (NS) flush 5-10 mL  5-10 mL IntraVENous PRN    morphine injection 4 mg  4 mg IntraVENous Q4H PRN    ondansetron (ZOFRAN) injection 4 mg  4 mg IntraVENous Q4H PRN    ticagrelor (BRILINTA) tablet 90 mg  90 mg Oral Q12H    aspirin delayed-release tablet 81 mg  81 mg Oral DAILY    fentaNYL (PF) 900 mcg/30 ml infusion soln   mcg/hr IntraVENous TITRATE    sodium chloride (NS) flush 5-10 mL  5-10 mL IntraVENous Q8H    sodium chloride (NS) flush 5-10 mL  5-10 mL IntraVENous PRN    mupirocin (BACTROBAN) 2 % ointment   Both Nostrils BID    chlorhexidine (PERIDEX) 0.12 % mouthwash 15 mL  15 mL Oral Q12H    propofol (DIPRIVAN) infusion  5-50 mcg/kg/min IntraVENous TITRATE        Objective:     Visit Vitals    /74    Pulse 89    Temp (!) 101.8 °F (38.8 °C)    Resp 29    Ht 5' 10\" (1.778 m)    Wt 124.9 kg (275 lb 5.7 oz)    SpO2 93%    BMI 39.51 kg/m2   Blood pressure 129/74, pulse 89, temperature (!) 101.8 °F (38.8 °C), resp. rate 29, height 5' 10\" (1.778 m), weight 124.9 kg (275 lb 5.7 oz), SpO2 93 %. 05/10 1901 - 05/12 0700  In: 2298.2 [I.V.:1858.2]  Out: 1320 [Urine:1170]      Intake/Output Summary (Last 24 hours) at 05/12/17 0715  Last data filed at 05/12/17 0200   Gross per 24 hour   Intake          1549.39 ml   Output              605 ml   Net           944.39 ml     Physical Exam:     General: intubated WM lying in bed in nad/R nare with packing  Chest:  Coarse breath sounds at bases b/l  Heart: S1, S2, RRR  GI: Soft, NT, ND + bowel sounds  Extremities: No edema or cyanosis    Labs:       Recent Results (from the past 24 hour(s))   CBC WITH AUTOMATED DIFF    Collection Time: 05/12/17  4:50 AM   Result Value Ref Range    WBC 21.3 (H) 4.1 - 11.1 K/uL    RBC 4.10 4. 10 - 5.70 M/uL    HGB 12.4 12.1 - 17.0 g/dL    HCT 37.1 36.6 - 50.3 %    MCV 90.5 80.0 - 99.0 FL    MCH 30.2 26.0 - 34.0 PG    MCHC 33.4 30.0 - 36.5 g/dL    RDW 13.3 11.5 - 14.5 %    PLATELET 491 727 - 800 K/uL    NEUTROPHILS 86 %    BAND NEUTROPHILS 1 %    LYMPHOCYTES 6 %    MONOCYTES 7 %    EOSINOPHILS 0 %    BASOPHILS 0 %    ABS. NEUTROPHILS 18.5 K/UL    ABS. LYMPHOCYTES 1.3 K/UL    ABS. MONOCYTES 1.5 K/UL    ABS. EOSINOPHILS 0.0 K/UL    ABS. BASOPHILS 0.0 K/UL    RBC COMMENTS NORMOCYTIC, NORMOCHROMIC      DF MANUAL     METABOLIC PANEL, COMPREHENSIVE    Collection Time: 05/12/17  4:50 AM   Result Value Ref Range    Sodium 136 136 - 145 mmol/L    Potassium 3.4 (L) 3.5 - 5.1 mmol/L    Chloride 104 97 - 108 mmol/L    CO2 25 21 - 32 mmol/L    Anion gap 7 5 - 15 mmol/L    Glucose 131 (H) 65 - 100 mg/dL    BUN 17 6 - 20 MG/DL    Creatinine 1.15 0.70 - 1.30 MG/DL    BUN/Creatinine ratio 15 12 - 20      GFR est AA >60 >60 ml/min/1.73m2    GFR est non-AA >60 >60 ml/min/1.73m2    Calcium 8.2 (L) 8.5 - 10.1 MG/DL    Bilirubin, total 0.6 0.2 - 1.0 MG/DL    ALT (SGPT) 54 12 - 78 U/L    AST (SGOT) 72 (H) 15 - 37 U/L    Alk. phosphatase 70 45 - 117 U/L    Protein, total 6.7 6.4 - 8.2 g/dL    Albumin 2.7 (L) 3.5 - 5.0 g/dL    Globulin 4.0 2.0 - 4.0 g/dL    A-G Ratio 0.7 (L) 1.1 - 2.2     MAGNESIUM    Collection Time: 05/12/17  4:50 AM   Result Value Ref Range    Magnesium 2.1 1.6 - 2.4 mg/dL   BLOOD GAS, ARTERIAL    Collection Time: 05/12/17  5:00 AM   Result Value Ref Range    pH 7.51 (H) 7.35 - 7.45      PCO2 31 (L) 35.0 - 45.0 mmHg    PO2 61 (L) 80 - 100 mmHg    O2 SAT 94 92 - 97 %    BICARBONATE 24 22 - 26 mmol/L    BASE EXCESS 1.8 mmol/L    O2 METHOD VENTILATOR      FIO2 100 %    MODE A/C      Tidal volume 550      SET RATE 12      EPAP/CPAP/PEEP 5.0      Sample source ARTERIAL      SITE RIGHT RADIAL      ALBER'S TEST YES       Recent Labs      05/12/17   0450  05/11/17   0419  05/10/17   0430   NA  136  136  136   K  3.4*  3.1*  3.3*   CL  104  102  100   CO2  25  24  27   BUN  17  15  21*   CREA  1.15  1.07  1.32*   GLU  131*  129*  109*   CA  8.2*  8.1*  7.9*   MG  2.1  2.2  2.2     Impression:    STEMI s/p Stent on Brillinta  Epistaxis         Plan:  Now appears that the red blood in from the nares and possibly this is bleeding into the pharynx. Stools without blood and therefore likely the old blood in OG tube may be from ENT source.   -stay on PPI for now  -recommend ENT evaluation  -will have on call GI see once over weekend         Fawn Amin MD    5/12/2017  3500  [address] [place] [address], [address] [place] [address]  [address] [phone] [address] [address]: [phone]

## 2017-05-12 NOTE — PROGRESS NOTES
Follow up visit to provide ongoing pastoral support. Jose L Mukherjee and his brother Noe Barth were present. Offered listening presence as Jose L Mukherjee gave update on Archie's morning. He remains realistic, but hopeful. Bebe is a great comfort at this time. Offered prayer at bedside. Chaplains will continue to visit for support.     MARILU Garsia, [facility], [address] [place] [address] [address]     Paging Service  [phone]

## 2017-05-12 NOTE — PROGRESS NOTES
Progress Note      5/12/2017 7:33 AM  NAME: Doris Nunez   MRN:  [id number]   Admit Diagnosis: STEMI (ST elevation myocardial infarction) ([facility] [address]      Problem List:     1. Anterior STEMI s/p 2 GISELE to the proximal LAD  2. Cardiac arrest s/p ROSC (VT/VF)  3. Aspiration w/ leukocytosis -- improving  4. Anoxic brain injury  5. Seizure-like activity  6. Acute kidney insufficiency -- resolved  7. Coagulopathy -- resolved; INR 2.5 -- down to 1.0  8. Shock liver -- resolved   9. Hypertension  10. Anxiety  11. GERD  12. Tobacco use; active  13. ;  is David Rosas ([phone])  15. Very strong FMHx of early CAD (father @ 52, mother @ 64, brother @ 37 dead from MIs)  13. Lives in [place]  16. FULL CODE     Assessment/Plan:   HDCT/C-spine ok  On ABX for aspiration event  Coffee-grounds from OGT; Hgb stable  Hemodynamically stable  Hopeful for neuro recovery  Off propofol -- withdraws to pain  EF 40% by echo   Febrile; central?  Discussed w/ ; waiting game    1. Continue ASA   2. Continue ticagrelor  3. Continue metoprolol 25mg Q12h  4. Hold atorvastatin given elevated LFTs  5. Hold ACEi/ARB for now (DIANE)  6. Antibiotics per pulmonary  7. Pulmonary/CC following  8. Neurology consult appreciated; antiepileptics per neurology  9. Gastroenterology consult appreciated  10. Given revascularization; not a candidate for ICD or LIFEVEST should he recover unless has other events during hospital stay         [x]       High complexity decision making was performed in this patient at high risk for decompensation with multiple organ involvement. Subjective:     Doris Nunez is intubated and sedated  Discussed with RN events overnight.      Review of Systems:    Symptom Y/N Comments  Symptom Y/N Comments   Fever/Chills N   Chest Pain N    Poor Appetite N   Edema N    Cough N   Abdominal Pain N    Sputum N   Joint Pain N    SOB/DAMON N   Pruritis/Rash N    Nausea/vomit N   Tolerating PT/OT Y Diarrhea N   Tolerating Diet Y    Constipation N   Other       Could NOT obtain due to:      Objective:      Physical Exam:    Last 24hrs VS reviewed since prior progress note. Most recent are:    Visit Vitals    /72    Pulse 84    Temp (!) 104 °F (40 °C)  Comment: Cooling blanket under patient    Resp 28    Ht 5' 10\" (1.778 m)    Wt 124.9 kg (275 lb 5.7 oz)    SpO2 93%    BMI 39.51 kg/m2       Intake/Output Summary (Last 24 hours) at 05/12/17 0935  Last data filed at 05/12/17 0900   Gross per 24 hour   Intake          2297.38 ml   Output              755 ml   Net          1542.38 ml        General Appearance: Well developed, well nourished, intubated. Obese  Ears/Nose/Mouth/Throat: Pupils equal and round, Hearing grossly normal.  Dried blood around oropharynx  Neck: Supple. JVP within normal limits. Carotids good upstrokes, with no bruit. Chest: Lungs clear to auscultation bilaterally. Cardiovascular: Regular rate and rhythm, S1S2 normal, no murmur, rubs, gallops. Abdomen: Soft, non-tender, bowel sounds are active. No organomegaly. Extremities: Trace edema bilaterally. Femoral pulses +2, Distal Pulses +1. Skin: Warm and dry. Neuro: Sedated    [x]         Post-cath site without hematoma, bruit, tenderness, or thrill. Distal pulses intact.     PMH/SH reviewed - no change compared to H&P    Data Review    Telemetry: sinus rhythm     EKG:   [x]  No new EKG for review    Lab Data Personally Reviewed:    Recent Labs      05/12/17 0450 05/11/17 0419   WBC  21.3*  26.6*   HGB  12.4  13.5   HCT  37.1  39.5   PLT  213  239     Recent Labs      05/11/17 0419 05/09/17   1800   INR  1.0  2.5*   PTP  10.1  26.2*      Recent Labs      05/12/17   0450  05/11/17   0419  05/10/17   0430   NA  136  136  136   K  3.4*  3.1*  3.3*   CL  104  102  100   CO2  25  24  27   BUN  17  15  21*   CREA  1.15  1.07  1.32*   GLU  131*  129*  109*   CA  8.2*  8.1*  7.9*   MG  2.1  2.2  2.2     Recent Labs 05/10/17   0430  05/09/17   1757   CPK  2872*  951*   CKNDX  3.1*  4.9*   TROIQ  41.80*  11.50*     Lab Results   Component Value Date/Time    Cholesterol, total 168 05/10/2017 04:30 AM    HDL Cholesterol 39 05/10/2017 04:30 AM    LDL, calculated 101 05/10/2017 04:30 AM    Triglyceride 140 05/10/2017 04:30 AM    CHOL/HDL Ratio 4.3 05/10/2017 04:30 AM       Recent Labs      05/12/17   0450  05/11/17   0419  05/10/17   0430   SGOT  72*  97*  193*   AP  70  73  87   TP  6.7  6.6  7.1   ALB  2.7*  3.1*  3.4*   GLOB  4.0  3.5  3.7     Recent Labs      05/12/17   0500  05/11/17   0600   PH  7.51*  7.45   PCO2  31*  40   PO2  61*  52*       Medications Personally Reviewed:    Current Facility-Administered Medications   Medication Dose Route Frequency    potassium chloride 10 mEq in 100 ml IVPB  10 mEq IntraVENous Q1H    0.9% sodium chloride infusion  100 mL/hr IntraVENous CONTINUOUS    acetaminophen (OFIRMEV) infusion 1,000 mg  1,000 mg IntraVENous Q6H PRN    albuterol-ipratropium (DUO-NEB) 2.5 MG-0.5 MG/3 ML  3 mL Nebulization QID RT    metoprolol tartrate (LOPRESSOR) tablet 25 mg  25 mg Oral Q12H    enoxaparin (LOVENOX) injection 40 mg  40 mg SubCUTAneous Q24H    piperacillin-tazobactam (ZOSYN) 3.375 g in 0.9% sodium chloride (MBP/ADV) 100 mL MBP  3.375 g IntraVENous Q8H    levETIRAcetam (KEPPRA) 500 mg in 0.9% sodium chloride IVPB  500 mg IntraVENous Q12H    LORazepam (ATIVAN) injection 2-5 mg  2-5 mg IntraVENous Q1H PRN    pantoprazole (PROTONIX) 40 mg in sodium chloride 0.9 % 10 mL injection  40 mg IntraVENous Q12H    sodium chloride (NS) flush 5-10 mL  5-10 mL IntraVENous Q8H    sodium chloride (NS) flush 5-10 mL  5-10 mL IntraVENous PRN    morphine injection 4 mg  4 mg IntraVENous Q4H PRN    ondansetron (ZOFRAN) injection 4 mg  4 mg IntraVENous Q4H PRN    ticagrelor (BRILINTA) tablet 90 mg  90 mg Oral Q12H    aspirin delayed-release tablet 81 mg  81 mg Oral DAILY    fentaNYL (PF) 900 mcg/30 ml infusion soln   mcg/hr IntraVENous TITRATE    sodium chloride (NS) flush 5-10 mL  5-10 mL IntraVENous Q8H    sodium chloride (NS) flush 5-10 mL  5-10 mL IntraVENous PRN    mupirocin (BACTROBAN) 2 % ointment   Both Nostrils BID    chlorhexidine (PERIDEX) 0.12 % mouthwash 15 mL  15 mL Oral Q12H    propofol (DIPRIVAN) infusion  5-50 mcg/kg/min IntraVENous TITRATE         [place] Halter III, DO

## 2017-05-12 NOTE — INTERDISCIPLINARY ROUNDS
Interdisciplinary team rounds were held 5/12/17 with the following team members:Care Management, Diabetes Treatment Specialist, Nursing, Nutrition, Pharmacy, Physical Therapy, Physician and Clinical Coordinator. Plan of care discussed. Goal: See MD orders and progress notes for further  interventions and desired outcomes.

## 2017-05-12 NOTE — PROGRESS NOTES
1100:Report received from Jesus Krishna 11   6600: Family at bedside, questions answered. 1500: pt reassessed, no changes noted. 1620: cooling blanket in monitor mode for temp 97.5  1742: Dr. Monica Traylor order stat EEG, nurse supervisor aware to call staff in.

## 2017-05-12 NOTE — PROGRESS NOTES
Bedside and Verbal shift change report given to Violetta Beverly RN (oncoming nurse) by Te Alarcon RN (offgoing nurse). Report included the following information SBAR, Kardex, Procedure Summary, Intake/Output, MAR, Recent Results, Med Rec Status and Cardiac Rhythm NSR/ST.

## 2017-05-12 NOTE — PROGRESS NOTES
Pt is a 49 yo male brought by EMS from [employer] [facility] after experiencing cardiac arrest in go cart. He was intubated, shocked then transported to [facility] where the LAD was found to be 90% stenosed. He underwent emergent angioplasty but later developed seizure like activity in CCU. He remains intubated and unresponsive. He is being followed by Intensivists, Cardiology, GI, Neurology. 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 His NOK is his spouse, Coretta Sanchez, who is present in hospital. Did not have insurance info initially but copies of pt's cards have been obtained, scanned and sent to [facility]. Prognosis is poor. CM will remain av to assist w/ dc planning as appropriate.     Care Management Interventions  PCP Verified by CM: No (PCP is in [place])  Transition of Care Consult (CM Consult): Discharge Planning, Other (CM reviewed chart for determination of insurance and for possible dc needs)  Physical Therapy Consult: No  Occupational Therapy Consult: No  Speech Therapy Consult: No  Current Support Network: Lives with Spouse  Discharge Location  Discharge Placement:  (TBD)     CECY Tesfaye

## 2017-05-12 NOTE — PROGRESS NOTES
5/12/2017    INTENSIVIST PROGRESS NOTE:     Patient seen and evaluated   49 yo male out of hospital arrest  Transferred to ED [facility], Dx'd with a STEMI, taken to cath lab for intervention  Admitted to ccu post cath unresponsive, intubated  No acute events overnight         Visit Vitals    /70    Pulse 91    Temp 100.1 °F (37.8 °C)    Resp (!) 35    Ht 5' 10\" (1.778 m)    Wt 124.9 kg (275 lb 5.7 oz)    SpO2 93%    BMI 39.51 kg/m2       General: comatose, unresponsive, febrile  CV: RRR  Lungs: clear    CXR: improving    LABS reviewed    A/P:  Vent support  No need for pressors for now  Post AMI care per cardiology  PUD/DVT prophylaxis  IV zosyn for aspiration pneumonia  Neuro eval ongoing, EEG revealed expected slowing  Keppra for seizure activity  Sedation as needed  Replete K again today  IVF  Tylenol iv for central fever  Will assist on disposition planning if pt recovers  Lin Pérez MD

## 2017-05-12 NOTE — PROGRESS NOTES
05/12/17 0611   ABCDE Bundle   SBT Safety Screen Passed No   SBT Screen Reason for Failure FiO2 > 50%

## 2017-05-12 NOTE — PROGRESS NOTES
Chief Complaint: seizure    Has twitching of the eyes and the mouth occurring randomly. Otherwise no response to visitors or nurse. Had to increase sedation because of tachypnea. STAT EEG called in and read a the bed side  Very little in the way of background activity and no seizures. Family informed. ASSESSMENT AND PLAN   1. Seizure  Continue keppra  EEG  Plan to take off sedation Monday and assess Neuro status. 2. Cardiac arrest  S/p stenting     3. Hyperlipidemia     4.Cervical spondylosis  MRI of the C spine when feasible  No longer myelopathic on exam    Patient is in critical condition and is at risk for sudden deterioration. Time: 90 minutes   Allergies  Review of patient's allergies indicates no known allergies.      Medications  Current Facility-Administered Medications   Medication Dose Route Frequency    0.9% sodium chloride infusion  100 mL/hr IntraVENous CONTINUOUS    acetaminophen (OFIRMEV) infusion 1,000 mg  1,000 mg IntraVENous Q6H PRN    albuterol-ipratropium (DUO-NEB) 2.5 MG-0.5 MG/3 ML  3 mL Nebulization QID RT    metoprolol tartrate (LOPRESSOR) tablet 25 mg  25 mg Oral Q12H    enoxaparin (LOVENOX) injection 40 mg  40 mg SubCUTAneous Q24H    piperacillin-tazobactam (ZOSYN) 3.375 g in 0.9% sodium chloride (MBP/ADV) 100 mL MBP  3.375 g IntraVENous Q8H    levETIRAcetam (KEPPRA) 500 mg in 0.9% sodium chloride IVPB  500 mg IntraVENous Q12H    LORazepam (ATIVAN) injection 2-5 mg  2-5 mg IntraVENous Q1H PRN    pantoprazole (PROTONIX) 40 mg in sodium chloride 0.9 % 10 mL injection  40 mg IntraVENous Q12H    sodium chloride (NS) flush 5-10 mL  5-10 mL IntraVENous PRN    morphine injection 4 mg  4 mg IntraVENous Q4H PRN    ondansetron (ZOFRAN) injection 4 mg  4 mg IntraVENous Q4H PRN    ticagrelor (BRILINTA) tablet 90 mg  90 mg Oral Q12H    aspirin delayed-release tablet 81 mg  81 mg Oral DAILY    fentaNYL (PF) 900 mcg/30 ml infusion soln   mcg/hr IntraVENous TITRATE    sodium chloride (NS) flush 5-10 mL  5-10 mL IntraVENous Q8H    mupirocin (BACTROBAN) 2 % ointment   Both Nostrils BID    chlorhexidine (PERIDEX) 0.12 % mouthwash 15 mL  15 mL Oral Q12H    propofol (DIPRIVAN) infusion  5-50 mcg/kg/min IntraVENous TITRATE        Medical History  Hyperlipidemia  hypertension    Exam:    Visit Vitals    /64    Pulse 74    Temp 97.5 °F (36.4 °C)    Resp 20    Ht 5' 10\" (1.778 m)    Wt 275 lb 5.7 oz (124.9 kg)    SpO2 95%    BMI 39.51 kg/m2     GEN: Intubated and sedated  HEENT: NC AT Anicteric sclera  CHEST: rhoncorous (Lt) No wheezes  HEART: S1 and S2 regular rate and rhythm  ABD: Soft nontender non distended  EXT: No edema     Neuro   Sedated intubated  Spontaneous eye movement  No clear signs of purposeful movement however appears to respond tvoice. Cranial Nerves : intact pupils, corneal reflex, no dolls eyes, No response to sound and no facial grimace. Withdraws extremities x4 to nailbed pressure  Reflexes: 3+  both lower extremities. Sustained achilles clonus again on exam.     Imaging    CT Results (most recent):    Results from Hospital Encounter encounter on 05/09/17   CT SPINE CERV WO CONT   Narrative INDICATION:   Recent trauma, spine with neck injury    COMPARISON: None. TECHNIQUE:   Noncontrast axial CT imaging of the cervical spine was performed. Coronal and sagittal reconstructions were obtained. CT dose reduction was achieved through the use of a standardized protocol  tailored for this examination and automatic exposure control for dose  modulation. FINDINGS:    There is no evidence of acute osseous abnormality. There is no acute alignment  abnormality. Vertebral body heights are maintained. There is no appreciable  prevertebral soft tissue swelling or epidural hematoma. There is multilevel  degenerative spondylosis. There is multilevel osseous neuroforaminal stenosis. There is central canal stenosis in the lower cervical spine. Evaluation of the  paraspinal soft tissues demonstrates no significant pathology. The visualized  lung apices are clear. The patient is intubated. There is an NG tube. Impression IMPRESSION:    1. No acute osseous abnormality. 2. Multilevel degenerative spondylosis.            .  Lab Review    Lab Results   Component Value Date/Time    WBC 21.3 05/12/2017 04:50 AM    HCT 37.1 05/12/2017 04:50 AM    HGB 12.4 05/12/2017 04:50 AM    PLATELET 143 27/10/1198 04:50 AM       Lab Results   Component Value Date/Time    Sodium 136 05/12/2017 04:50 AM    Potassium 3.4 05/12/2017 04:50 AM    Chloride 104 05/12/2017 04:50 AM    CO2 25 05/12/2017 04:50 AM    Glucose 131 05/12/2017 04:50 AM    BUN 17 05/12/2017 04:50 AM    Creatinine 1.15 05/12/2017 04:50 AM    Calcium 8.2 05/12/2017 04:50 AM       Lab Results   Component Value Date/Time    Hemoglobin A1c 5.5 05/10/2017 04:30 AM        Lab Results   Component Value Date/Time    Cholesterol, total 168 05/10/2017 04:30 AM    HDL Cholesterol 39 05/10/2017 04:30 AM    LDL, calculated 101 05/10/2017 04:30 AM    VLDL, calculated 28 05/10/2017 04:30 AM    Triglyceride 140 05/10/2017 04:30 AM    CHOL/HDL Ratio 4.3 05/10/2017 04:30 AM

## 2017-05-13 NOTE — PROGRESS NOTES
Gastroenterology Daily Progress Note Mehul Barreto for Katherin)    Admit Date: 5/9/2017       Subjective:       Spoke with RN and family at bedside. Some emesis yesterday, blood-tinged. Now, stable. OG tubing clear, but some reddish liquid in the suction cannister. R nare packed due to previous epistaxis. No melena.     Current Facility-Administered Medications   Medication Dose Route Frequency    lisinopril (PRINIVIL, ZESTRIL) tablet 2.5 mg  2.5 mg Oral DAILY    potassium chloride 10 mEq in 100 ml IVPB  10 mEq IntraVENous Q1H    0.9% sodium chloride infusion  50 mL/hr IntraVENous CONTINUOUS    acetaminophen (OFIRMEV) infusion 1,000 mg  1,000 mg IntraVENous Q6H PRN    albuterol-ipratropium (DUO-NEB) 2.5 MG-0.5 MG/3 ML  3 mL Nebulization QID RT    enoxaparin (LOVENOX) injection 40 mg  40 mg SubCUTAneous Q24H    piperacillin-tazobactam (ZOSYN) 3.375 g in 0.9% sodium chloride (MBP/ADV) 100 mL MBP  3.375 g IntraVENous Q8H    levETIRAcetam (KEPPRA) 500 mg in 0.9% sodium chloride IVPB  500 mg IntraVENous Q12H    LORazepam (ATIVAN) injection 2-5 mg  2-5 mg IntraVENous Q1H PRN    pantoprazole (PROTONIX) 40 mg in sodium chloride 0.9 % 10 mL injection  40 mg IntraVENous Q12H    sodium chloride (NS) flush 5-10 mL  5-10 mL IntraVENous PRN    morphine injection 4 mg  4 mg IntraVENous Q4H PRN    ondansetron (ZOFRAN) injection 4 mg  4 mg IntraVENous Q4H PRN    ticagrelor (BRILINTA) tablet 90 mg  90 mg Oral Q12H    aspirin delayed-release tablet 81 mg  81 mg Oral DAILY    fentaNYL (PF) 900 mcg/30 ml infusion soln   mcg/hr IntraVENous TITRATE    sodium chloride (NS) flush 5-10 mL  5-10 mL IntraVENous Q8H    mupirocin (BACTROBAN) 2 % ointment   Both Nostrils BID    chlorhexidine (PERIDEX) 0.12 % mouthwash 15 mL  15 mL Oral Q12H    propofol (DIPRIVAN) infusion  5-50 mcg/kg/min IntraVENous TITRATE        Objective:     Visit Vitals    /77    Pulse 66    Temp 99.5 °F (37.5 °C)    Resp 14    Ht 5' 10\" (1.778 m)    Wt 124.9 kg (275 lb 5.7 oz)    SpO2 100%    BMI 39.51 kg/m2   Blood pressure 128/77, pulse 66, temperature 99.5 °F (37.5 °C), resp. rate 14, height 5' 10\" (1.778 m), weight 124.9 kg (275 lb 5.7 oz), SpO2 100 %. 05/13 0701 - 05/13 1900  In: 373.3 [I.V.:373.3]  Out: 25 [Urine:25]    05/11 1901 - 05/13 0700  In: 5540.1 [I.V.:4345.1]  Out: 0170 [Urine:1455]      Intake/Output Summary (Last 24 hours) at 05/13/17 1022  Last data filed at 05/13/17 1000   Gross per 24 hour   Intake          4233.12 ml   Output             1060 ml   Net          3173.12 ml     Physical Exam:     General: intubated WM lying in bed in nad/R nare with packing  Chest:  Coarse breath sounds at bases b/l  Heart: S1, S2, RRR  GI: firm, NT, ND + bowel sounds  Extremities: No cyanosis    Labs:       Recent Results (from the past 24 hour(s))   BLOOD GAS, ARTERIAL    Collection Time: 05/13/17  2:00 AM   Result Value Ref Range    pH 7.36 7.35 - 7.45      PCO2 43 35.0 - 45.0 mmHg    PO2 62 (L) 80 - 100 mmHg    O2 SAT 91 (L) 92 - 97 %    BICARBONATE 24 22 - 26 mmol/L    BASE DEFICIT 1.4 mmol/L    O2 METHOD VENTILATOR      FIO2 100 %    MODE PRESSURE CONTROL      SET RATE 20      IPAP/PIP 25      EPAP/CPAP/PEEP 13.0      Sample source ARTERIAL      SITE RIGHT BRACHIAL      ALBER'S TEST N/A     METABOLIC PANEL, COMPREHENSIVE    Collection Time: 05/13/17  2:32 AM   Result Value Ref Range    Sodium 140 136 - 145 mmol/L    Potassium 3.2 (L) 3.5 - 5.1 mmol/L    Chloride 105 97 - 108 mmol/L    CO2 24 21 - 32 mmol/L    Anion gap 11 5 - 15 mmol/L    Glucose 106 (H) 65 - 100 mg/dL    BUN 14 6 - 20 MG/DL    Creatinine 0.93 0.70 - 1.30 MG/DL    BUN/Creatinine ratio 15 12 - 20      GFR est AA >60 >60 ml/min/1.73m2    GFR est non-AA >60 >60 ml/min/1.73m2    Calcium 8.4 (L) 8.5 - 10.1 MG/DL    Bilirubin, total 0.6 0.2 - 1.0 MG/DL    ALT (SGPT) 59 12 - 78 U/L    AST (SGOT) 77 (H) 15 - 37 U/L    Alk.  phosphatase 78 45 - 117 U/L    Protein, total 6.4 6.4 - 8.2 g/dL    Albumin 2.4 (L) 3.5 - 5.0 g/dL    Globulin 4.0 2.0 - 4.0 g/dL    A-G Ratio 0.6 (L) 1.1 - 2.2     CBC W/O DIFF    Collection Time: 05/13/17  2:32 AM   Result Value Ref Range    WBC 23.3 (H) 4.1 - 11.1 K/uL    RBC 3.93 (L) 4.10 - 5.70 M/uL    HGB 12.0 (L) 12.1 - 17.0 g/dL    HCT 35.4 (L) 36.6 - 50.3 %    MCV 90.1 80.0 - 99.0 FL    MCH 30.5 26.0 - 34.0 PG    MCHC 33.9 30.0 - 36.5 g/dL    RDW 13.3 11.5 - 14.5 %    PLATELET 833 917 - 514 K/uL   MAGNESIUM    Collection Time: 05/13/17  2:32 AM   Result Value Ref Range    Magnesium 2.2 1.6 - 2.4 mg/dL   BLOOD GAS, ARTERIAL    Collection Time: 05/13/17  5:40 AM   Result Value Ref Range    pH 7.51 (H) 7.35 - 7.45      PCO2 26 (L) 35.0 - 45.0 mmHg    PO2 89 80 - 100 mmHg    O2 SAT 98 (H) 92 - 97 %    BICARBONATE 20 (L) 22 - 26 mmol/L    BASE DEFICIT 1.0 mmol/L    O2 METHOD VENTILATOR      FIO2 100 %    MODE PRESSURE CONTROL      SET RATE 20      IPAP/PIP 25      EPAP/CPAP/PEEP 13.0      Sample source ARTERIAL      SITE RIGHT RADIAL      ALBER'S TEST YES       Recent Labs      05/13/17   0232  05/12/17   0450  05/11/17   0419   NA  140  136  136   K  3.2*  3.4*  3.1*   CL  105  104  102   CO2  24  25  24   BUN  14  17  15   CREA  0.93  1.15  1.07   GLU  106*  131*  129*   CA  8.4*  8.2*  8.1*   MG  2.2  2.1  2.2     Impression:    STEMI s/p Stent on Brillinta  Epistaxis  Normocytic anemia, stable         Plan:  Now appears that the red blood in from the nares and possibly this is bleeding into the pharynx, swallowed blood and could explain blood in emesis/OG suction. Stools without blood and therefore likely the old blood in OG tube may be from ENT source.   - continue PPI for now  -recommend ENT evaluation if h/h dropping  -will see tomorrow on request, otherwise, Dr. Myrna Jimenez to resume care Monday         Omega Pan MD    5/13/2017  3500  [address], [address] [place] [address]. [phone]  [address] [place] [address] [place]: [phone]

## 2017-05-13 NOTE — CARDIO/PULMONARY
C/P Rehab: Chart Reviewed. Admit: Anterior STEMI s/p 2 GISELE to the proximal LAD, LV EF 40%. Cardiac arrest s/p ROSC (VT/VF) with anoxic brain injury.     Mhx: HTN    Pt intubated. Teaching deferred.

## 2017-05-13 NOTE — PROGRESS NOTES
Followed up with patient on CCU per shift report. Patient was not interactive and family was at bedside. Family was very appreciative of  support, the prayer shawl that had been provided them,  and reported that the  had been by to anoint patient. Family had no needs at time of visit other than the continued assurance of prayers. Cornelia Thompson M.S., M.Div.   [facility] ([id number])

## 2017-05-13 NOTE — PROGRESS NOTES
Cardiology Progress Note  5/13/2017    Admit Date: 5/9/2017  Admit Diagnosis: STEMI (ST elevation myocardial infarction) ([facility] [address]  CC:  None currently       Problem List (as per Dr Len Nixon):      1. Anterior STEMI s/p 2 GISELE to the proximal LAD  2. Cardiac arrest s/p ROSC (VT/VF)  3. Aspiration w/ leukocytosis -- improving  4. Anoxic brain injury  5. Seizure-like activity  6. Acute kidney insufficiency -- resolved  7. Coagulopathy -- resolved; INR 2.5 -- down to 1.0  8. Shock liver -- resolved   9. Hypertension  10. Anxiety  11. GERD  12. Tobacco use; active  13. ;  is Steven Andrews ([phone])  15. Very strong FMHx of early CAD (father @ 52, mother @ 64, brother @ 37 dead from MIs)  13. Lives in [place]  16. FULL CODE      Assessment/Plan (as per Dr Len Nixon): HDCT/C-spine ok  On ABX for aspiration event  Coffee-grounds from OGT; Hgb stable  Hemodynamically stable  Hopeful for neuro recovery  Off propofol -- withdraws to pain  EF 40% by echo   Febrile; central?  Discussed w/ ; waiting game     1. Continue ASA   2. Continue ticagrelor  3. Continue metoprolol 25mg Q12h  4. Hold atorvastatin given elevated LFTs  5. Hold ACEi/ARB for now (DIANE)  6. Antibiotics per pulmonary  7. Pulmonary/CC following  8. Neurology consult appreciated; antiepileptics per neurology  9. Gastroenterology consult appreciated  10. Given revascularization; not a candidate for ICD or LIFEVEST should he recover unless has other events during hospital stay        5/13: Remains sedated/intubated. Afebrile; Stable BP/HR. Remains in asa/brilinta/metoprolol 25 bid; On ABx for prob aspiration PNA (RLL). Metabolic alkalosis being addressed by pulm  Low K being Rxed. Mild gen anasarca: net +: decrease IVF to 50; may need some lasix soon. Adding lisinopril (2.5 qnoon), decrease metoprolol to 12.5 bid for now. D/W cousins who are at bedside currently. For other plans, see orders.   High complexity decision making was performed  X Yes   High-risk of decompensation with multiple organ involvement X Yes   Hospital problem list   Active Hospital Problems    Diagnosis Date Noted    STEMI (ST elevation myocardial infarction) ([facility] [address] 2017                                                         Subjective:  Patient reports  [x]   nothing; unable to communicate    [x]   intubated   Chest pain  none  consistent with:  Non-cardiac CP         Atypical CP     None now  On going  Anginal CP     Dyspnea  none  at rest  with exertion         improved  unchanged  worse              PND  none  overnight       Orthopnea  none  improved  unchanged  worse   Presyncope  none  improved  unchanged  worse   Ambulated in hallway without symptoms   Yes   Ambulated in room without symptoms  Yes     ROS Hematuria:  Yes X No Dysuria:  Yes X No                (2+  other systems) Cough: Yes X No Sputum: Yes X No                 BRBPR:  Yes X No Melena: Yes X No   No change in family and social history from H&P/Consult note.   Objective:    Physical Exam:  24 hr VS reviewed, overall VSSAF  Temp (24hrs), Av °F (37.2 °C), Min:96.8 °F (36 °C), Max:101.2 °F (38.4 °C)    Patient Vitals for the past 8 hrs:   Pulse   17 0900 67   17 0800 81   17 0727 79   17 0700 79   17 0600 72   17 0500 70   17 0443 70   17 0400 70   17 0300 69   17 0238 70   17 0200 74   17 0123 71    Patient Vitals for the past 8 hrs:   Resp   17 0900 19   17 0800 20   17 0727 20   17 0700 20   17 0600 20   17 0500 20   17 0443 20   17 0400 20   17 0300 20   17 0238 20   17 0200 (!) 32   17 0123 (!) 36    Patient Vitals for the past 8 hrs:   BP   17 0900 129/72   17 0800 131/76   17 0700 133/80   17 0600 113/59   17 0500 116/61   17 0400 110/68   17 0300 110/63   17 8729 112/66   05/13/17 0200 122/63   05/13/17 0123 130/62        Intake/Output Summary (Last 24 hours) at 05/13/17 0913  Last data filed at 05/13/17 0900   Gross per 24 hour   Intake          4353. 85 ml   Output             1110 ml   Net          3243.85 ml     General: x WD,WN  Elderly  Cachetic  NAD     Agitated  Lethargic  Arousable  Obtunded    x Sedated  On Bipap x Intubated                ENT/Palate: X WNL  Dry MM  anicteric                Respiratory: X CTA ant/lat  Nl resp effort  Increased effort  No significant change     rhonchi  rales  improved  worse              Cardiovasc: X RRR  IRRR X Nl S1, S2 x No rub     No murmur X No new murmur  Murmur c/w: x No gallop    x Mild gen edema  BLE edema:+  RLE edema:+  LLE edema:+     Edema less  Edema more  Edema same  Edema worse    X Nl JVP  Elevated JVP  JVP same  JVP worse    X Carotid wnl x abd aorta not palpated X no peripheral emboli noted                GI: X abd soft x nondistended X BS present X No organo- megaly noted              Skin: X Warm, dry  Cold extremites                  Neuro:  A/O  Grossly non- focal  Obtunded x Sedated     Lethargic  Arousable x intubated       cath site intact w/o hematoma or new bruit; distal pulse unchanged  Yes   Data Review:     Telemetry independently reviewed x sinus  chronic afib  parox afib  NSVT     ECG independently reviewed  NSR  afib  no significant changes  NSST-Tw chgs   x no new ECG provided for review   Lab results reviewed as noted below. Current medications reviewed as noted below. Recent Labs      05/13/17   0540  05/13/17   0200   PH  7.51*  7.36   PCO2  26*  43   PO2  89  62*     No results for input(s): CPK, CKMB in the last 72 hours.     No lab exists for component: TROPONINI  Recent Labs      05/13/17   0232  05/12/17   0450  05/11/17   0419   NA  140  136  136   K  3.2*  3.4*  3.1*   CL  105  104  102   CO2  24  25  24   BUN  14  17  15   CREA  0.93  1.15  1.07   GLU  106*  131*  129*   CA  8.4* 8.2*  8.1*   ALB  2.4*  2.7*  3.1*   WBC  23.3*  21.3*  26.6*   HGB  12.0*  12.4  13.5   HCT  35.4*  37.1  39.5   PLT  193  213  239     Recent Labs      05/13/17   0232  05/12/17   0450  05/11/17   0419   SGOT  77*  72*  97*   AP  78  70  73   TBILI  0.6  0.6  0.6   TP  6.4  6.7  6.6   ALB  2.4*  2.7*  3.1*   GLOB  4.0  4.0  3.5     Recent Labs      05/11/17   0419   INR  1.0   PTP  10.1      No results for input(s): FE, TIBC, PSAT, FERR in the last 72 hours.    No results found for: GLUCPOC    Current Facility-Administered Medications   Medication Dose Route Frequency    metoprolol tartrate (LOPRESSOR) tablet 12.5 mg  12.5 mg Oral Q12H    lisinopril (PRINIVIL, ZESTRIL) tablet 2.5 mg  2.5 mg Oral DAILY    potassium chloride 10 mEq in 100 ml IVPB  10 mEq IntraVENous Q1H    0.9% sodium chloride infusion  50 mL/hr IntraVENous CONTINUOUS    acetaminophen (OFIRMEV) infusion 1,000 mg  1,000 mg IntraVENous Q6H PRN    albuterol-ipratropium (DUO-NEB) 2.5 MG-0.5 MG/3 ML  3 mL Nebulization QID RT    enoxaparin (LOVENOX) injection 40 mg  40 mg SubCUTAneous Q24H    piperacillin-tazobactam (ZOSYN) 3.375 g in 0.9% sodium chloride (MBP/ADV) 100 mL MBP  3.375 g IntraVENous Q8H    levETIRAcetam (KEPPRA) 500 mg in 0.9% sodium chloride IVPB  500 mg IntraVENous Q12H    LORazepam (ATIVAN) injection 2-5 mg  2-5 mg IntraVENous Q1H PRN    pantoprazole (PROTONIX) 40 mg in sodium chloride 0.9 % 10 mL injection  40 mg IntraVENous Q12H    sodium chloride (NS) flush 5-10 mL  5-10 mL IntraVENous PRN    morphine injection 4 mg  4 mg IntraVENous Q4H PRN    ondansetron (ZOFRAN) injection 4 mg  4 mg IntraVENous Q4H PRN    ticagrelor (BRILINTA) tablet 90 mg  90 mg Oral Q12H    aspirin delayed-release tablet 81 mg  81 mg Oral DAILY    fentaNYL (PF) 900 mcg/30 ml infusion soln   mcg/hr IntraVENous TITRATE    sodium chloride (NS) flush 5-10 mL  5-10 mL IntraVENous Q8H    mupirocin (BACTROBAN) 2 % ointment   Both Nostrils BID    chlorhexidine (PERIDEX) 0.12 % mouthwash 15 mL  15 mL Oral Q12H    propofol (DIPRIVAN) infusion  5-50 mcg/kg/min IntraVENous TITRATE         Oleg Clement MD

## 2017-05-13 NOTE — PROCEDURES
[address]   EEG       Name:  Esthela Poe   MR#:  [id number]   :  1970   Account #:  [de-identified]    Date of Procedure:  2017   Date of Adm:  2017       EXAM DATE: 2017    EXAM NUMBER: [id number]    DESCRIPTION OF PROCEDURE: Electrodes were applied in   accordance with the interdental 10-20 system for electrode placement. The EEG was reviewed in both bipolar and referential montages. In   addition to EEG data, EKG data was also recorded. DESCRIPTION OF FINDINGS: Background consists of very little   cortical activity. There are no changes seen with activation. Much of the   study was EKG artifact. Cortical activity; however, could not be   excluded. IMPRESSION: This EEG is abnormal for marked cortical suppression,   most likely secondary to medications. No seizures were noted on this   study. Absence of seizures on this study does not exclude epilepsy. Clinical correlation is advised.       MD HARDIK Salinas / FIOR   D:  2017   08:31   T:  2017   08:45   Job #:  [id number]

## 2017-05-13 NOTE — PROGRESS NOTES
0710: Report received from NISH Gallo.   4529: pt hr drop to 44 for a few minutes, Dr. Harriet Lua aware, d/c metoprolol at this time. BP stable 115/67  1620: pt noted to have twitching in left neck and left eye. Pt then thrashing in bed momentarily. 1625: PRN ativan given 4mg, started propofol at 25 mcg/kg/min. 1630: increase propofol to 45mcg/kg/min as pt continues to have jerking/thrashing episodes. 1638 increase propofol to 50mcg/kg/min, noted increase temp and 100.1 and increase respirations. 1650: Dr. Harriet Lua updated on pt condition.

## 2017-05-14 NOTE — PROGRESS NOTES
Bedside and Verbal shift change report given to NATALIO Santos RN (oncoming nurse) by [employer]. Karlos Baxter RN (offgoing nurse). Report included the following information SBAR, Kardex, Intake/Output, MAR, Recent Results and Cardiac Rhythm Sinus stefano-NSR.   1930: Assessment completed. Patient sedated on Propofol @ 50mcg/kg/min and Fentanyl @ 75mcg/hr. No withdrawal from painful stimulus at this time. Remains intubated, #7.5 ETT secured at 24cm @ teeth. PCV, rate 20, PC 25, FiO2 100%, PEEP +13. RR 20, PEEP 39. Lungs rhonchorous throughout, small amount tan/bloody oral/ETT secretions. Abdomen semi-soft, round, very hypoactive bowel sounds. OGT secured at 54cm @ lip, to low continuous suction. Peripheral pulses palpable. Velazquez patent and draining minimal clear jose miguel urine. No distress noted. Family at bedside. 2002: Sinus stefano 50's on bedside monitor. Propofol decreased to 25mcg/kg/min. BP stable. 0000: Assessment unchanged. 0200: Patient currently on Propofol 15mcg/kg/min. RR 20. +Cough with suction, but calms easily and RR back to 20/min (vent setting). Still no withdrawal from painful stimulus. 0300: Artifact noted on cardiac monitor. Patient with fine tremor noted in chest and L arm. ? R/t shivers (patient on cooling blanket for fever) vs possible seizure activity. RR 22, BP, HR stable. Cooling blanket turned off (temp 99.3). Propofol increased back to 25mcg/kg/min.   0400: Assessment unchanged. 4058: Propofol decreased to 15mcg/kg/min.  0615: Propofol stopped for SAT. Fentanyl remains at 50mcg/hr. 6234: [facility] calls for update. Notified of definite cough reflex, but still no withdrawal to pain, pupils not reactive to light. VSS. Plans for repeat EEG tomorrow (Monday) off Propofol and Fentanyl.

## 2017-05-14 NOTE — PROGRESS NOTES
5/14/2017    INTENSIVIST PROGRESS NOTE:     Patient seen and evaluated   49 yo male out of hospital arrest  Transferred to ED [facility], Dx'd with a STEMI, taken to cath lab for intervention  Admitted to ccu post cath unresponsive, intubated  No acute events overnight         Visit Vitals    /57    Pulse 68    Temp 100.2 °F (37.9 °C)    Resp 20    Ht 5' 10\" (1.778 m)    Wt 124.9 kg (275 lb 5.7 oz)    SpO2 100%    BMI 39.51 kg/m2       General: comatose, unresponsive, febrile  CV: RRR  Lungs: clear    CXR: no changes    LABS reviewed    A/P:  Vent support, on PCV  No need for pressors for now  Post AMI care per cardiology  PUD/DVT prophylaxis  IV zosyn for aspiration pneumonia  Neuro eval ongoing, EEG revealed expected slowing  Keppra for seizure activity  Sedation as needed  IVF  Tylenol iv for central fever  Will assist on disposition planning if pt recovers  Art Boone MD

## 2017-05-14 NOTE — PROGRESS NOTES
8193: Report received from NISH Gallo.   0930: Propofol restarted r/t pt coughing/gagging eyes fluttering. Family at bedside. 1100: pt reassessed, no changes noted.    1500: no changes in assessment

## 2017-05-14 NOTE — PROGRESS NOTES
Cardiology Progress Note  5/14/2017    Admit Date: 5/9/2017  Admit Diagnosis: STEMI (ST elevation myocardial infarction) ([facility] [address]  CC:  None currently       Problem List (as per Dr Kimberly Razo):      1. Anterior STEMI s/p 2 GISELE to the proximal LAD  2. Cardiac arrest s/p ROSC (VT/VF)  3. Aspiration w/ leukocytosis -- improving  4. Anoxic brain injury  5. Seizure-like activity  6. Acute kidney insufficiency -- resolved  7. Coagulopathy -- resolved; INR 2.5 -- down to 1.0  8. Shock liver -- resolved   9. Hypertension  10. Anxiety  11. GERD  12. Tobacco use; active  13. ;  is [facility] ([phone])  15. Very strong FMHx of early CAD (father @ 52, mother @ 64, brother @ 37 dead from MIs)  13. Lives in [place]  16. FULL CODE      Assessment/Plan (as per Dr Kimberly Razo): HDCT/C-spine ok  On ABX for aspiration event  Coffee-grounds from OGT; Hgb stable  Hemodynamically stable  Hopeful for neuro recovery  Off propofol -- withdraws to pain  EF 40% by echo   Febrile; central?  Discussed w/ ; waiting game     1. Continue ASA   2. Continue ticagrelor  3. Continue metoprolol 25mg Q12h  4. Hold atorvastatin given elevated LFTs  5. Hold ACEi/ARB for now (DIANE)  6. Antibiotics per pulmonary  7. Pulmonary/CC following  8. Neurology consult appreciated; antiepileptics per neurology  9. Gastroenterology consult appreciated  10. Given revascularization; not a candidate for ICD or LIFEVEST should he recover unless has other events during hospital stay        5/13: Remains sedated/intubated. Afebrile; Stable BP/HR. Remains in asa/brilinta/metoprolol 25 bid; On ABx for prob aspiration PNA (RLL). Metabolic alkalosis being addressed by pulm  Low K being Rxed. Mild gen anasarca: net +: decrease IVF to 50; may need some lasix soon. Adding lisinopril (2.5 qnoon), decrease metoprolol to 12.5 bid for now. D/W cousins who are at bedside currently.     5/14: Remains sedated/intubated; Afebrile; WBC improved; Cr nl. Lytes nl. Stable hemodynamics except reported sinus stefano to 40s after metoprolol 25 so stopped. Retry metoprolol at 12.5 bid (will help when off propofol); Tolerating low dose acei. Large net +: decrease IVF further; see how he does with a low dose of lasix (looking for slight net neg). For other plans, see orders. High complexity decision making was performed  X Yes   High-risk of decompensation with multiple organ involvement X Yes   Hospital problem list   Active Hospital Problems    Diagnosis Date Noted    STEMI (ST elevation myocardial infarction) ([facility] [address] 2017                                                         Subjective:  Patient reports  [x]   nothing; unable to communicate    [x]   intubated   Chest pain  none  consistent with:  Non-cardiac CP         Atypical CP     None now  On going  Anginal CP     Dyspnea  none  at rest  with exertion         improved  unchanged  worse              PND  none  overnight       Orthopnea  none  improved  unchanged  worse   Presyncope  none  improved  unchanged  worse   Ambulated in hallway without symptoms   Yes   Ambulated in room without symptoms  Yes     ROS Hematuria:  Yes X No Dysuria:  Yes X No                (2+  other systems) Cough: Yes X No Sputum: Yes X No                 BRBPR:  Yes X No Melena: Yes X No   No change in family and social history from H&P/Consult note.   Objective:    Physical Exam:  24 hr VS reviewed, overall VSSAF  Temp (24hrs), Av.8 °F (37.1 °C), Min:97.3 °F (36.3 °C), Max:100.2 °F (37.9 °C)    Patient Vitals for the past 8 hrs:   Pulse   17 0900 68   17 0800 68   17 0740 70   17 0700 73   17 0645 73   17 0630 72   17 0600 66   17 0500 67   17 0411 70   17 0400 69   17 0300 74   17 0200 78    Patient Vitals for the past 8 hrs:   Resp   17 0900 20   17 0800 20   17 0740 20   17 0700 20   17 0645 20   05/14/17 0630 20   05/14/17 0600 20   05/14/17 0500 19   05/14/17 0411 20   05/14/17 0400 20   05/14/17 0300 22   05/14/17 0200 20    Patient Vitals for the past 8 hrs:   BP   05/14/17 0900 117/63   05/14/17 0800 120/57   05/14/17 0700 129/60   05/14/17 0630 118/61   05/14/17 0600 111/60   05/14/17 0500 113/59   05/14/17 0400 125/66   05/14/17 0300 144/58   05/14/17 0200 133/73          Intake/Output Summary (Last 24 hours) at 05/14/17 0911  Last data filed at 05/14/17 0900   Gross per 24 hour   Intake          2690.65 ml   Output              760 ml   Net          1930.65 ml     General: x WD,WN  Elderly  Cachetic  NAD     Agitated  Lethargic  Arousable  Obtunded    x Sedated  On Bipap x Intubated                ENT/Palate: X WNL  Dry MM  anicteric                Respiratory: X CTA ant/lat  Nl resp effort  Increased effort  No significant change     rhonchi  rales  improved  worse              Cardiovasc: X RRR  IRRR X Nl S1, S2 x No rub     No murmur X No new murmur  Murmur c/w: x No gallop    x Mild gen edema  BLE edema:+  RLE edema:+  LLE edema:+     Edema less  Edema more  Edema same x Edema worse    X Nl JVP  Elevated JVP  JVP same  JVP worse    X Carotid wnl x abd aorta not palpated X no peripheral emboli noted                GI: X abd soft x nondistended X BS present X No organo- megaly noted              Skin: X Warm, dry  Cold extremites                  Neuro:  A/O  Grossly non- focal  Obtunded x Sedated     Lethargic  Arousable x intubated       cath site intact w/o hematoma or new bruit; distal pulse unchanged  Yes   Data Review:     Telemetry independently reviewed x sinus  chronic afib  parox afib  NSVT     ECG independently reviewed  NSR  afib  no significant changes  NSST-Tw chgs   x no new ECG provided for review   Lab results reviewed as noted below. Current medications reviewed as noted below.   Recent Labs      05/14/17   0558  05/13/17   0540   PH  7.44  7.51*   PCO2  33*  26*   PO2 87  89     No results for input(s): CPK, CKMB in the last 72 hours. No lab exists for component: TROPONINI  Recent Labs      05/14/17   0431  05/13/17 0232 05/12/17 0450   NA  137  140  136   K  3.7  3.2*  3.4*   CL  105  105  104   CO2  25  24  25   BUN  19  14  17   CREA  0.97  0.93  1.15   GLU  102*  106*  131*   CA  8.4*  8.4*  8.2*   ALB   --   2.4*  2.7*   WBC  20.8*  23.3*  21.3*   HGB  10.7*  12.0*  12.4   HCT  32.5*  35.4*  37.1   PLT  225  193  213     Recent Labs      05/13/17 0232 05/12/17 0450   SGOT  77*  72*   AP  78  70   TBILI  0.6  0.6   TP  6.4  6.7   ALB  2.4*  2.7*   GLOB  4.0  4.0     No results for input(s): INR, PTP, APTT in the last 72 hours. No lab exists for component: INREXT, INREXT   No results for input(s): FE, TIBC, PSAT, FERR in the last 72 hours.    No results found for: GLUCPOC    Current Facility-Administered Medications   Medication Dose Route Frequency    lisinopril (PRINIVIL, ZESTRIL) tablet 2.5 mg  2.5 mg Oral DAILY    0.9% sodium chloride infusion  50 mL/hr IntraVENous CONTINUOUS    albuterol-ipratropium (DUO-NEB) 2.5 MG-0.5 MG/3 ML  3 mL Nebulization QID RT    enoxaparin (LOVENOX) injection 40 mg  40 mg SubCUTAneous Q24H    piperacillin-tazobactam (ZOSYN) 3.375 g in 0.9% sodium chloride (MBP/ADV) 100 mL MBP  3.375 g IntraVENous Q8H    levETIRAcetam (KEPPRA) 500 mg in 0.9% sodium chloride IVPB  500 mg IntraVENous Q12H    LORazepam (ATIVAN) injection 2-5 mg  2-5 mg IntraVENous Q1H PRN    pantoprazole (PROTONIX) 40 mg in sodium chloride 0.9 % 10 mL injection  40 mg IntraVENous Q12H    sodium chloride (NS) flush 5-10 mL  5-10 mL IntraVENous PRN    morphine injection 4 mg  4 mg IntraVENous Q4H PRN    ondansetron (ZOFRAN) injection 4 mg  4 mg IntraVENous Q4H PRN    ticagrelor (BRILINTA) tablet 90 mg  90 mg Oral Q12H    aspirin delayed-release tablet 81 mg  81 mg Oral DAILY    fentaNYL (PF) 900 mcg/30 ml infusion soln   mcg/hr IntraVENous TITRATE  sodium chloride (NS) flush 5-10 mL  5-10 mL IntraVENous Q8H    chlorhexidine (PERIDEX) 0.12 % mouthwash 15 mL  15 mL Oral Q12H    propofol (DIPRIVAN) infusion  5-50 mcg/kg/min IntraVENous TITRATE         Sara Deleon MD

## 2017-05-15 PROBLEM — G93.1 ANOXIC BRAIN INJURY (HCC): Status: ACTIVE | Noted: 2017-01-01

## 2017-05-15 NOTE — PROGRESS NOTES
Patient examined, no pulse, no BS, no heart rhythm in monitor  Patient pronounced 345pm  Family at bedside  Lacho Davis MD

## 2017-05-15 NOTE — PROGRESS NOTES
05/15/17 0452   ABCDE Bundle   SBT Safety Screen Passed No   SBT Screen Reason for Failure FiO2 > 50%;PEEP > 7     No SBT pt unresponsive.

## 2017-05-15 NOTE — PROGRESS NOTES
Nutrition Assessment:    RECOMMENDATIONS:       ASSESSMENT:   Chart reviewed, case discussed during CCU rounds. Pt remains intubated and sedated on propofol @ 22. 4mL/h which provides 591 kcals daily. OGT to suction as pt had a vomiting episode on Friday when he was being turned. Noted likely brain death per neurology progress notes. Family is discussing withdrawal.  Labs reviewed, K+ 3.3 and being repleted. Noted no BM since admission. Will monitor plan of care, discussed with Dr. Sofía Murphy and plan is to hold TF for today. Dietitians Intervention(s)/Plan(s): Monitor plan of care  SUBJECTIVE/OBJECTIVE:   Pt intubated and unresponsive  Diet Order: NPO  % Eaten:  No data found. to suction  at   flush with       via OG Tube   Residuals: 100 mL    Pertinent Medications:protonix, zosyn, KCl; [email or web address]); Drips: propofol. Chemistries:  Lab Results   Component Value Date/Time    Sodium 137 05/15/2017 04:43 AM    Potassium 3.3 05/15/2017 04:43 AM    Chloride 105 05/15/2017 04:43 AM    CO2 19 05/15/2017 04:43 AM    Anion gap 13 05/15/2017 04:43 AM    Glucose 98 05/15/2017 04:43 AM    BUN 18 05/15/2017 04:43 AM    Creatinine 0.74 05/15/2017 04:43 AM    BUN/Creatinine ratio 24 05/15/2017 04:43 AM    GFR est AA >60 05/15/2017 04:43 AM    GFR est non-AA >60 05/15/2017 04:43 AM    Calcium 8.5 05/15/2017 04:43 AM    Albumin 2.4 05/13/2017 02:32 AM      Anthropometrics: Height: 5' 10\" (177.8 cm) Weight: 124.9 kg (275 lb 5.7 oz)    IBW (%IBW): 75.5 kg (166 lb 7.2 oz) ( ) UBW (%UBW):   (  %)    BMI: Body mass index is 39.51 kg/(m^2). This BMI is indicative of:  []Underweight   []Normal   []Overweight   [] Obesity   [x] Extreme Obesity (BMI>40)  Estimated Nutrition Needs (Based on): 2569 Kcals/day (PSU (MSJ 2125)) , 88 g (1gPro/kg ABW) Protein  Carbohydrate:  At Least 130 g/day  Fluids: 2200 mL/day or per MD     Last BM: PTA   []Active     []Hyperactive  [x]Hypoactive       [] Absent   BS  Skin:    [x] Intact   [] Incision  [] Breakdown   [] DTI   [] Tears/Excoriation/Abrasion  []Edema [] Other: Wt Readings from Last 30 Encounters:   05/11/17 124.9 kg (275 lb 5.7 oz)      NUTRITION DIAGNOSES:   Problem:  Inadequate protein-energy intake      Etiology: related to pt intubated and NPO      Signs/Symptoms: as evidenced by NPO + propofol meets <25% kcal and 0% protein needs. Previous dx re: inadequate protein energy intake continues, TF is on hold. NUTRITION INTERVENTIONS:                    GOAL:   Plan of care will be determined for nutrition support vs comfort care in 2-4 days.      NUTRITION MONITORING AND EVALUATION   Previous Goal: Pt will tolerate TF initiation with residuals <250mL in 2-4 days  Previous Goal Met: No (Pt had vomiting on Friday)   Previous Recommendations Implemented: Yes   Cultural, Hindu, or Ethnic Dietary Needs: None   LEARNING NEEDS (Diet, Food/Nutrient-Drug Interaction):    [x] None Identified   [] Identified and Education Provided/Documented   [] Identified and Pt declined/was not appropriate      [x] Interdisciplinary Care Plan Reviewed/Documented    [x] Participated in Discharge Planning: Unable to determine    [x] Interdisciplinary Rounds     NUTRITION RISK:    [x] High              [] Moderate           []  Low  []  Minimal/Uncompromised      Aroldo Dey, [address]  Pager [phone]  Weekend Pager [phone]

## 2017-05-15 NOTE — PROCEDURES
Patient Name: Maegan Leos  : 1970  Age: 48 y.o. Ordering physician: No ref. provider found  Date of EE/15/2017  EEG procedure number: [id number]  Diagnosis:anoxic brain injury and seizures  Interpreting physician: Huan Espinosa MD      ELECTROENCEPHALOGRAM REPORT     PROCEDURE: EEG. CLINICAL INDICATION: The patient is a 48 y.o. male with a history of   possible seizures. EEG to rule out seizures, rule out stroke, rule out   cortical abnormality. EEG CLASSIFICATION: Abnormal     DESCRIPTION OF THE RECORD:   There is no discernable background in this recording. EEG is non reactive. No epileptiform discharges. There is no discernable sleep pattern. INTERPRETATION:   EEG CLASSIFICATION: Abnormal     DESCRIPTION OF THE RECORD:   There is no discernable cerebral activity at or above 2uV. No reactivity present. EKG artifact is seen throughout. No response to photic stimulation. No sleep architecture seen. INTERPRETATION:   Abnormal. These findings are consistent with electrocerebral inactivity. This record is supportive of the diagnosis of brain death if clinical criteria also met; although this EEG is not confirmatory of brain death because it does not meet the ACNS standards for suspected brain death.     Asad Farrell MD  5/15/2017  11:39 AM

## 2017-05-15 NOTE — PROGRESS NOTES
Responded to notification of patient passing after withdrawal.  I was in unit as patient passed. Provided support to  Jim Mc, brother and brother-in-law. Words of comfort given. Walked through next steps with . He is contacting a  home in [place]; shared with Jim Mc that he could just call back to main hospital number and ask for nursing supervisor when he had decided on  arrangements. Assured him of continued prayers and care. No further immediate pastoral care needs identified at this time; pastoral care will remain available as needed. [phone]. Visit by: Lauren Vaca. Dallas Lucas.  Rocky Oliver MA, [facility]    Lead  [employer]

## 2017-05-15 NOTE — CARDIO/PULMONARY
C/P Rehab: Chart Reviewed.      Admit: Anterior STEMI s/p 2 GISELE to the proximal LAD, LV EF 40%. Cardiac arrest s/p ROSC (VT/VF) with anoxic brain injury.      Mhx: HTN     Pt intubated. Cardiology note today:  HDCT: Diffuse brain edema with complete loss of gray-white matter  differentiation and effacement of the sulci. EF 40% by echo   Febrile; central?  Discussed w/ /family  Prognosis is grim given HDCT findings     1. Continue ASA   2. Continue ticagrelor  3. Continue lisinopril 2.5mg  4. Continue metoprolol 12.5mg Q12h  5. Lasix as needed to keep a negative balance  6. Hold atorvastatin given elevated LFTs  7. Antibiotics per pulmonary  8. Pulmonary/CC following  9. Neurology consult appreciated; antiepileptics per neurology  10. Gastroenterology consult appreciated  11. Palliative care discussed w/ family. Will wait for EEG and neurology input.         Teaching deferred.

## 2017-05-15 NOTE — PROGRESS NOTES
Attended Interdisciplinary rounds in Critical Care Unit, where patient care was discussed. Visit by: Warren Harris. Gardenia Douglas.  Melissa Eduardo MA, [address]

## 2017-05-15 NOTE — PROGRESS NOTES
Progress Note      5/15/2017 7:33 AM  NAME: Jb Christine   MRN:  [id number]   Admit Diagnosis: STEMI (ST elevation myocardial infarction) ([facility] [address]      Problem List:     1. Anterior STEMI s/p 2 GISELE to the proximal LAD  2. Cardiac arrest s/p ROSC (VT/VF)  3. Aspiration w/ leukocytosis -- improving  4. Anoxic brain injury  5. Seizure-like activity  6. Acute kidney insufficiency -- resolved  7. Coagulopathy -- resolved; INR 2.5 -- down to 1.0  8. Shock liver -- resolved   9. Hypertension  10. Anxiety  11. GERD  12. Tobacco use; active  13. ;  is [facility] ([phone])  15. Very strong FMHx of early CAD (father @ 52, mother @ 64, brother @ 37 dead from MIs)  13. [place] [id number] [address]  16. FULL CODE     Assessment/Plan:   HDCT:  Diffuse brain edema with complete loss of gray-white matter  differentiation and effacement of the sulci. EF 40% by echo   Febrile; central?  Discussed w/ /family  Prognosis is grim given HDCT findings    1. Continue ASA   2. Continue ticagrelor  3. Continue lisinopril 2.5mg  4. Continue metoprolol 12.5mg Q12h  5. Lasix as needed to keep a negative balance  6. Hold atorvastatin given elevated LFTs  7. Antibiotics per pulmonary  8. Pulmonary/CC following  9. Neurology consult appreciated; antiepileptics per neurology  10. Gastroenterology consult appreciated  11. Palliative care discussed w/ family. Will wait for EEG and neurology input. [x]       High complexity decision making was performed in this patient at high risk for decompensation with multiple organ involvement. Subjective:     Jb Christine is intubated and sedated  Discussed with RN events overnight.      Review of Systems:    Symptom Y/N Comments  Symptom Y/N Comments   Fever/Chills N   Chest Pain N    Poor Appetite N   Edema N    Cough N   Abdominal Pain N    Sputum N   Joint Pain N    SOB/DAMON N   Pruritis/Rash N    Nausea/vomit N   Tolerating PT/OT Y    Diarrhea N   Tolerating Diet Y    Constipation N   Other       Could NOT obtain due to:      Objective:      Physical Exam:    Last 24hrs VS reviewed since prior progress note. Most recent are:    Visit Vitals    /77    Pulse 78    Temp (!) 100.5 °F (38.1 °C)    Resp 20    Ht 5' 10\" (1.778 m)    Wt 124.9 kg (275 lb 5.7 oz)    SpO2 100%    BMI 39.51 kg/m2       Intake/Output Summary (Last 24 hours) at 05/15/17 0800  Last data filed at 05/15/17 0700   Gross per 24 hour   Intake          1726.23 ml   Output             1238 ml   Net           488.23 ml        General Appearance: Well developed, well nourished, intubated. Obese  Ears/Nose/Mouth/Throat: Pupils equal and round, Hearing grossly normal.  Dried blood around oropharynx  Neck: Supple. JVP within normal limits. Carotids good upstrokes, with no bruit. Chest: Lungs with scattered rhonchi  Cardiovascular: Regular rate and rhythm, S1S2 normal, no murmur, rubs, gallops. Abdomen: Soft, non-tender, bowel sounds are active. No organomegaly. Extremities: 1+ edema bilaterally. Femoral pulses +2, Distal Pulses +1. Skin: Warm and dry. Neuro: Sedated    [x]         Post-cath site without hematoma, bruit, tenderness, or thrill. Distal pulses intact. PMH/SH reviewed - no change compared to H&P    Data Review    Telemetry: sinus rhythm     EKG:   [x]  No new EKG for review    Lab Data Personally Reviewed:    Recent Labs      05/14/17   0431 05/13/17   0232   WBC  20.8*  23.3*   HGB  10.7*  12.0*   HCT  32.5*  35.4*   PLT  225  193     No results for input(s): INR, PTP, APTT in the last 72 hours. No lab exists for component: Janet Curtison   Recent Labs      05/15/17   0443  05/14/17   0431  05/13/17   0232   NA  137  137  140   K  3.3*  3.7  3.2*   CL  105  105  105   CO2  19*  25  24   BUN  18  19  14   CREA  0.74  0.97  0.93   GLU  98  102*  106*   CA  8.5  8.4*  8.4*   MG   --    --   2.2     No results for input(s): CPK, CKNDX, TROIQ in the last 72 hours.     No lab exists for component: CPKMB  Lab Results   Component Value Date/Time    Cholesterol, total 168 05/10/2017 04:30 AM    HDL Cholesterol 39 05/10/2017 04:30 AM    LDL, calculated 101 05/10/2017 04:30 AM    Triglyceride 140 05/10/2017 04:30 AM    CHOL/HDL Ratio 4.3 05/10/2017 04:30 AM       Recent Labs      05/13/17   0232   SGOT  77*   AP  78   TP  6.4   ALB  2.4*   GLOB  4.0     Recent Labs      05/15/17   0606  05/14/17   0558   PH  7.41  7.44   PCO2  33*  33*   PO2  199*  87       Medications Personally Reviewed:    Current Facility-Administered Medications   Medication Dose Route Frequency    potassium chloride 10 mEq in 100 ml IVPB  10 mEq IntraVENous Q1H    metoprolol tartrate (LOPRESSOR) tablet 12.5 mg  12.5 mg Oral Q12H    levETIRAcetam (KEPPRA) 1,000 mg in 0.9% sodium chloride IVPB  1,000 mg IntraVENous Q12H    acetaminophen (OFIRMEV) infusion 1,000 mg  1,000 mg IntraVENous Q6H PRN    lisinopril (PRINIVIL, ZESTRIL) tablet 2.5 mg  2.5 mg Oral DAILY    0.9% sodium chloride infusion  25 mL/hr IntraVENous CONTINUOUS    albuterol-ipratropium (DUO-NEB) 2.5 MG-0.5 MG/3 ML  3 mL Nebulization QID RT    enoxaparin (LOVENOX) injection 40 mg  40 mg SubCUTAneous Q24H    piperacillin-tazobactam (ZOSYN) 3.375 g in 0.9% sodium chloride (MBP/ADV) 100 mL MBP  3.375 g IntraVENous Q8H    LORazepam (ATIVAN) injection 2-5 mg  2-5 mg IntraVENous Q1H PRN    pantoprazole (PROTONIX) 40 mg in sodium chloride 0.9 % 10 mL injection  40 mg IntraVENous Q12H    sodium chloride (NS) flush 5-10 mL  5-10 mL IntraVENous PRN    morphine injection 4 mg  4 mg IntraVENous Q4H PRN    ondansetron (ZOFRAN) injection 4 mg  4 mg IntraVENous Q4H PRN    ticagrelor (BRILINTA) tablet 90 mg  90 mg Oral Q12H    aspirin delayed-release tablet 81 mg  81 mg Oral DAILY    fentaNYL (PF) 900 mcg/30 ml infusion soln   mcg/hr IntraVENous TITRATE    sodium chloride (NS) flush 5-10 mL  5-10 mL IntraVENous Q8H    chlorhexidine (PERIDEX) 0.12 % mouthwash 15 mL  15 mL Oral Q12H    propofol (DIPRIVAN) infusion  5-50 mcg/kg/min IntraVENous TITRATE         Aquilino Ginny III, DO

## 2017-05-15 NOTE — PROGRESS NOTES
Chief Complaint: none    Pt intubated and unresponsive. Family meeting today to discuss EEG and CT had results. Allergies  Review of patient's allergies indicates no known allergies.      Medications  Current Facility-Administered Medications   Medication Dose Route Frequency    dextrose 5% infusion  50 mL/hr IntraVENous CONTINUOUS    metoprolol tartrate (LOPRESSOR) tablet 12.5 mg  12.5 mg Oral Q12H    levETIRAcetam (KEPPRA) 1,000 mg in 0.9% sodium chloride IVPB  1,000 mg IntraVENous Q12H    acetaminophen (OFIRMEV) infusion 1,000 mg  1,000 mg IntraVENous Q6H PRN    lisinopril (PRINIVIL, ZESTRIL) tablet 2.5 mg  2.5 mg Oral DAILY    enoxaparin (LOVENOX) injection 40 mg  40 mg SubCUTAneous Q24H    piperacillin-tazobactam (ZOSYN) 3.375 g in 0.9% sodium chloride (MBP/ADV) 100 mL MBP  3.375 g IntraVENous Q8H    LORazepam (ATIVAN) injection 2-5 mg  2-5 mg IntraVENous Q1H PRN    pantoprazole (PROTONIX) 40 mg in sodium chloride 0.9 % 10 mL injection  40 mg IntraVENous Q12H    sodium chloride (NS) flush 5-10 mL  5-10 mL IntraVENous PRN    morphine injection 4 mg  4 mg IntraVENous Q4H PRN    ondansetron (ZOFRAN) injection 4 mg  4 mg IntraVENous Q4H PRN    ticagrelor (BRILINTA) tablet 90 mg  90 mg Oral Q12H    aspirin delayed-release tablet 81 mg  81 mg Oral DAILY    fentaNYL (PF) 900 mcg/30 ml infusion soln   mcg/hr IntraVENous TITRATE    sodium chloride (NS) flush 5-10 mL  5-10 mL IntraVENous Q8H    chlorhexidine (PERIDEX) 0.12 % mouthwash 15 mL  15 mL Oral Q12H    propofol (DIPRIVAN) infusion  5-50 mcg/kg/min IntraVENous TITRATE        Medical History  Hyperlipidemia  hypertension    Exam:    Visit Vitals    /68    Pulse (!) 45    Temp 100.3 °F (37.9 °C)    Resp 25    Ht 5' 10\" (1.778 m)    Wt 275 lb 5.7 oz (124.9 kg)    SpO2 (!) 14%    BMI 39.51 kg/m2     GEN: Intubated and sedated  HEENT: NC AT Anicteric sclera  CHEST: rhoncorous (Lt) No wheezes  HEART: S1 and S2 regular rate and rhythm  ABD: Soft nontender non distended  EXT: No edema     Neuro   Sedated intubated  No eye movements today. No clear signs of purposeful movement    Cranial Nerves : intact pupil at 3 mm and minimally reactives, corneal reflex, no dolls eyes, No response to sound and no facial grimace. No withdrawal to pain   Reflexes: 3+  both lower extremities. Imaging    EEG: Profound diffuse dysfunction with no reactivity    CT Results (most recent):    Results from Hospital Encounter encounter on 05/09/17   CT HEAD WO CONT   Narrative EXAM:  CT HEAD WITHOUT CONTRAST  INDICATION: Anoxic brain injury. COMPARISON: 5/10/2017. CONTRAST: None. TECHNIQUE: Unenhanced CT of the head was performed using 5 mm images. Brain and  bone windows were generated. Sagittal and coronal reformations were generated. CT dose reduction was achieved through use of a standardized protocol tailored  for this examination and automatic exposure control for dose modulation. CT dose  reduction was achieved through use of a standardized protocol tailored for this  examination and automatic exposure control for dose modulation. Adaptive  statistical iterative reconstruction (ASIR) was utilized for this examination. FINDINGS:  The ventricles are small and the sulci are completely effaced. There is  complete loss of gray-white matter differentiation. There is no intracranial  hemorrhage. There is no extra-axial collection, mass, mass effect or midline  shift. The basilar cisterns are open. No acute infarct is identified. The bone  windows demonstrate no abnormalities. There is mucoperiosteal thickening and  fluid with almost complete opacification of the paranasal sinuses. Impression IMPRESSION: Diffuse brain edema with complete loss of gray-white matter  differentiation and effacement of the sulci. EEG: Slowing but could be secondary to sedation. No seizures  .   Lab Review    Lab Results   Component Value Date/Time    WBC 20.8 05/14/2017 04:31 AM    HCT 32.5 05/14/2017 04:31 AM    HGB 10.7 05/14/2017 04:31 AM    PLATELET 154 74/35/7699 04:31 AM       Lab Results   Component Value Date/Time    Sodium 137 05/15/2017 04:43 AM    Potassium 3.3 05/15/2017 04:43 AM    Chloride 105 05/15/2017 04:43 AM    CO2 19 05/15/2017 04:43 AM    Glucose 98 05/15/2017 04:43 AM    BUN 18 05/15/2017 04:43 AM    Creatinine 0.74 05/15/2017 04:43 AM    Calcium 8.5 05/15/2017 04:43 AM       Lab Results   Component Value Date/Time    Hemoglobin A1c 5.5 05/10/2017 04:30 AM        Lab Results   Component Value Date/Time    Cholesterol, total 168 05/10/2017 04:30 AM    HDL Cholesterol 39 05/10/2017 04:30 AM    LDL, calculated 101 05/10/2017 04:30 AM    VLDL, calculated 28 05/10/2017 04:30 AM    Triglyceride 140 05/10/2017 04:30 AM    CHOL/HDL Ratio 4.3 05/10/2017 04:30 AM            ASSESSMENT AND PLAN   This is a 66-year-old gentleman who presented status post out of hospital arrest with PEA. He did have a 6 minute period of no resuscitation prior to EMS arriving. He also had some evidence for myoclonus on admission. He has continued to require propofol for sedation and seizure activity. Keppra was increased but patient continues to have myoclonus. EEG showed no brain activity. CT head showed diffuse loss of gray-white matter. 1. Seizure  Keppra 1 g twice daily  EEG with no cerebral activity  CT head with diffuse edema    2. Cardiac arrest  S/p stenting     3. Hyperlipidemia     Based on testing from today I believe the patient has no meaningful neurological recovery. I discussed this with patient's  and other family members. Decision was made to withdraw care. All questions were answered. Neurology is available for further questions or concerns.     Over 45 minutes was spent in the care of this critically ill patient reviewing records, discussing with nursing and Dr. Delmy Yousif, obtaining history from family, examining patient and discussing treatment plans with family.

## 2017-05-15 NOTE — PROGRESS NOTES
1900- Patient on propofol at 40 mcg/kg/min. Fentanyl at 25 mcg/hr. 4874- Patient to CT.   7921- Propofol off.   2000- [facility] updated on patient. 0700- Report to Blanquita Bolivar RN    Patient remains with cough, patient does not withdrawal extremities to pain. Pupils are pinpoint and round.

## 2017-05-15 NOTE — PROGRESS NOTES
Follow up visit to check on patient's family. Pt's  Jose L Mukherjee present, along with patient's brother and Jimenez's brother. Introduced myself to family and offered supportive listening. Jose L Mukherjee shared that he understands and that it appears that patient is not responding and is aware of what this may mean; he is waiting confirmation from physicians. They have been together for 25 years and is shared that while he understands he is just praying for strength to get through. Affirmed Jimenez's emotions and assured him of support through process. Family expressed appreciation for support of Theresa Vela and for Father Jeromy Recioy anointing patient. Pastoral care will continue to follow. Please page as needed/desired. [phone]. Visit by: Miriam Melendez. Brennan Barakat.  Rocky Oliver MA, [facility]    [employer]

## 2017-05-15 NOTE — INTERDISCIPLINARY ROUNDS
Interdisciplinary team rounds were held 5/15/2017 with the following team members:Care Management, Diabetes Treatment Specialist, Nursing, Nutrition, Pastoral Care, Pharmacy and Physician.     Plan of care discussed. See clinical pathway and/or care plan for interventions and desired outcomes.

## 2017-05-15 NOTE — PROGRESS NOTES
Bedside report received from Parveen Esquivel RN                  Assessment, Background, Procedure summary, Intake/Output, MAR, and recent results discussed. Care assumed. EEG completed at bedside. Propofol and fentanyl remain off during test. Restarted medication following procedure. 1100 Seizure-like activity noted. 2mg ativan administered. Propofol increased. 1259 Patient again displays seizure activity and is coughing against ventilator. 2mg ativan administered. Propofol increased. Respiratory notified and in room to assess patient. 36  Family voices desire to remove ventilation following family discussion with Dr. Rosalio Suggs. Family asks for time to be with patient and will notify nurse when they are ready to proceed. 36 Paged [facility] for update on patient and notification of family's decision to withdraw ventilator support. Humphrey Friend, Copordinator at [employer] withdrawal.     1440 Patient's  states he is ready to withdraw care from patient. MD notified. 1450 4mg Ativan and 4mg morphine IV administered in preparation for withdrawal from ventilator. Propofol stopped and maintenance lines stopped. Fentanyl continues at 200mcg/hr. 1458 Patient extubated and care withdrawn. OG tube removed. Patient suctioned and placed on 2L NC for comfort. 1543 Patient pronounced  by Dr. David Keane MD.    [address] notified of patient death. In room with family. Provided family with contact information. Patient's  took patient's ring home with him. Documented on death record.  [address] notified of time of death. [facility] approves release of patient for both organ donation and eye bank donation. Patient bathed and all lines and tubing removed from patient. Family in room with patient. [place] [facility]'s [place].
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
## 2017-05-15 NOTE — PROGRESS NOTES
5/15/2017    INTENSIVIST PROGRESS NOTE:     Patient seen and evaluated   49 yo male out of hospital arrest  Transferred to ED [facility], Dx'd with a STEMI, taken to cath lab for intervention  Admitted to ccu post cath unresponsive, intubated  No acute events overnight         Visit Vitals    /77    Pulse 78    Temp (!) 100.5 °F (38.1 °C)    Resp 20    Ht 5' 10\" (1.778 m)    Wt 124.9 kg (275 lb 5.7 oz)    SpO2 100%    BMI 39.51 kg/m2       General: comatose, unresponsive, febrile  CV: RRR  Lungs: clear    CXR: no changes    LABS reviewed    A/P:  Vent support, on PCV, decrease RR  No need for pressors for now  PUD/DVT prophylaxis  IV zosyn for aspiration pneumonia  Replete K  Neuro eval ongoing, EEG and head ct today  Keppra for seizure activity  Sedation as needed  IVF  Tylenol iv for central fever  Will assist on disposition planning if pt recovers  Estuardo Santos MD
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
## 2017-05-23 NOTE — ROUTINE PROCESS
Quality: Chart reviewed for death and restraints. Restraints (bilat soft wrist) noted 5/9/17-5/10/17. Restraints not a contributing factor in patient death. Documented for tracking according to CMS guidelines at 0909 on 5/23/17.

## 2024-08-05 NOTE — PROGRESS NOTES
5/13/2017    INTENSIVIST PROGRESS NOTE:     Patient seen and evaluated   49 yo male out of hospital arrest  Transferred to ED [facility], Dx'd with a STEMI, taken to cath lab for intervention  Admitted to ccu post cath unresponsive, intubated  No acute events overnight         Visit Vitals    /72    Pulse 67    Temp 99.5 °F (37.5 °C)    Resp 19    Ht 5' 10\" (1.778 m)    Wt 124.9 kg (275 lb 5.7 oz)    SpO2 99%    BMI 39.51 kg/m2       General: comatose, unresponsive, febrile  CV: RRR  Lungs: clear    CXR: improving    LABS reviewed    A/P:  Vent support, on PCV  No need for pressors for now  Post AMI care per cardiology  PUD/DVT prophylaxis  IV zosyn for aspiration pneumonia  Neuro eval ongoing, EEG revealed expected slowing  Keppra for seizure activity  Sedation as needed  Replete K again today  IVF  Tylenol iv for central fever  Will assist on disposition planning if pt recovers  Aleksandra Guzman MD Detail Level: Generalized Detail Level: Detailed Detail Level: Zone